# Patient Record
Sex: MALE | Race: WHITE | NOT HISPANIC OR LATINO | URBAN - METROPOLITAN AREA
[De-identification: names, ages, dates, MRNs, and addresses within clinical notes are randomized per-mention and may not be internally consistent; named-entity substitution may affect disease eponyms.]

---

## 2024-02-05 ENCOUNTER — HOSPITAL ENCOUNTER (INPATIENT)
Facility: HOSPITAL | Age: 74
LOS: 4 days | Discharge: HOME HEALTH | DRG: 552 | End: 2024-02-09
Attending: INTERNAL MEDICINE | Admitting: INTERNAL MEDICINE
Payer: MEDICARE

## 2024-02-05 DIAGNOSIS — G95.20 CORD COMPRESSION: ICD-10-CM

## 2024-02-05 DIAGNOSIS — R53.1 WEAKNESS: ICD-10-CM

## 2024-02-05 PROBLEM — R53.81 DEBILITY: Status: ACTIVE | Noted: 2024-02-05

## 2024-02-05 PROBLEM — E11.9 TYPE 2 DIABETES MELLITUS WITHOUT COMPLICATION, WITHOUT LONG-TERM CURRENT USE OF INSULIN: Status: ACTIVE | Noted: 2024-02-05

## 2024-02-05 PROBLEM — N40.0 BPH (BENIGN PROSTATIC HYPERPLASIA): Status: ACTIVE | Noted: 2024-02-05

## 2024-02-05 PROBLEM — M46.20 SPINAL ABSCESS: Status: ACTIVE | Noted: 2024-02-05

## 2024-02-05 PROBLEM — E03.9 HYPOTHYROID: Status: ACTIVE | Noted: 2024-02-05

## 2024-02-05 PROBLEM — G37.3 TRANSVERSE MYELITIS: Status: ACTIVE | Noted: 2024-02-05

## 2024-02-05 LAB
ABO + RH BLD: NORMAL
ALBUMIN SERPL BCP-MCNC: 3.7 G/DL (ref 3.5–5.2)
ALP SERPL-CCNC: 77 U/L (ref 55–135)
ALT SERPL W/O P-5'-P-CCNC: 25 U/L (ref 10–44)
ANION GAP SERPL CALC-SCNC: 8 MMOL/L (ref 8–16)
AST SERPL-CCNC: 25 U/L (ref 10–40)
BACTERIA #/AREA URNS AUTO: ABNORMAL /HPF
BASOPHILS # BLD AUTO: 0.02 K/UL (ref 0–0.2)
BASOPHILS NFR BLD: 0.2 % (ref 0–1.9)
BILIRUB SERPL-MCNC: 1.4 MG/DL (ref 0.1–1)
BILIRUB UR QL STRIP: NEGATIVE
BLD GP AB SCN CELLS X3 SERPL QL: NORMAL
BUN SERPL-MCNC: 13 MG/DL (ref 8–23)
CALCIUM SERPL-MCNC: 9.1 MG/DL (ref 8.7–10.5)
CHLORIDE SERPL-SCNC: 105 MMOL/L (ref 95–110)
CLARITY UR REFRACT.AUTO: CLEAR
CO2 SERPL-SCNC: 18 MMOL/L (ref 23–29)
COLOR UR AUTO: YELLOW
CREAT SERPL-MCNC: 0.8 MG/DL (ref 0.5–1.4)
DIFFERENTIAL METHOD BLD: ABNORMAL
EOSINOPHIL # BLD AUTO: 0 K/UL (ref 0–0.5)
EOSINOPHIL NFR BLD: 0 % (ref 0–8)
ERYTHROCYTE [DISTWIDTH] IN BLOOD BY AUTOMATED COUNT: 14.2 % (ref 11.5–14.5)
EST. GFR  (NO RACE VARIABLE): >60 ML/MIN/1.73 M^2
GLUCOSE SERPL-MCNC: 156 MG/DL (ref 70–110)
GLUCOSE UR QL STRIP: NEGATIVE
HCT VFR BLD AUTO: 34 % (ref 40–54)
HGB BLD-MCNC: 12.2 G/DL (ref 14–18)
HGB UR QL STRIP: ABNORMAL
IMM GRANULOCYTES # BLD AUTO: 0.08 K/UL (ref 0–0.04)
IMM GRANULOCYTES NFR BLD AUTO: 0.6 % (ref 0–0.5)
KETONES UR QL STRIP: ABNORMAL
LEUKOCYTE ESTERASE UR QL STRIP: ABNORMAL
LYMPHOCYTES # BLD AUTO: 0.6 K/UL (ref 1–4.8)
LYMPHOCYTES NFR BLD: 5.1 % (ref 18–48)
MAGNESIUM SERPL-MCNC: 1.8 MG/DL (ref 1.6–2.6)
MCH RBC QN AUTO: 30.7 PG (ref 27–31)
MCHC RBC AUTO-ENTMCNC: 35.9 G/DL (ref 32–36)
MCV RBC AUTO: 86 FL (ref 82–98)
MICROSCOPIC COMMENT: ABNORMAL
MONOCYTES # BLD AUTO: 0.6 K/UL (ref 0.3–1)
MONOCYTES NFR BLD: 5.1 % (ref 4–15)
NEUTROPHILS # BLD AUTO: 11.1 K/UL (ref 1.8–7.7)
NEUTROPHILS NFR BLD: 89 % (ref 38–73)
NITRITE UR QL STRIP: POSITIVE
NRBC BLD-RTO: 0 /100 WBC
PH UR STRIP: 7 [PH] (ref 5–8)
PHOSPHATE SERPL-MCNC: 2.2 MG/DL (ref 2.7–4.5)
PLATELET # BLD AUTO: 80 K/UL (ref 150–450)
PMV BLD AUTO: 9.8 FL (ref 9.2–12.9)
POCT GLUCOSE: 126 MG/DL (ref 70–110)
POTASSIUM SERPL-SCNC: 4.4 MMOL/L (ref 3.5–5.1)
PROT SERPL-MCNC: 7 G/DL (ref 6–8.4)
PROT UR QL STRIP: ABNORMAL
RBC # BLD AUTO: 3.97 M/UL (ref 4.6–6.2)
RBC #/AREA URNS AUTO: 8 /HPF (ref 0–4)
SODIUM SERPL-SCNC: 131 MMOL/L (ref 136–145)
SP GR UR STRIP: 1.01 (ref 1–1.03)
SPECIMEN OUTDATE: NORMAL
SQUAMOUS #/AREA URNS AUTO: 0 /HPF
URN SPEC COLLECT METH UR: ABNORMAL
WBC # BLD AUTO: 12.43 K/UL (ref 3.9–12.7)
WBC #/AREA URNS AUTO: 26 /HPF (ref 0–5)

## 2024-02-05 PROCEDURE — 20000000 HC ICU ROOM

## 2024-02-05 PROCEDURE — 63600175 PHARM REV CODE 636 W HCPCS

## 2024-02-05 PROCEDURE — 84100 ASSAY OF PHOSPHORUS: CPT | Performed by: NURSE PRACTITIONER

## 2024-02-05 PROCEDURE — 36415 COLL VENOUS BLD VENIPUNCTURE: CPT | Performed by: INTERNAL MEDICINE

## 2024-02-05 PROCEDURE — 86850 RBC ANTIBODY SCREEN: CPT | Performed by: NURSE PRACTITIONER

## 2024-02-05 PROCEDURE — 25000003 PHARM REV CODE 250: Performed by: NURSE PRACTITIONER

## 2024-02-05 PROCEDURE — 80053 COMPREHEN METABOLIC PANEL: CPT | Performed by: NURSE PRACTITIONER

## 2024-02-05 PROCEDURE — 83735 ASSAY OF MAGNESIUM: CPT | Performed by: NURSE PRACTITIONER

## 2024-02-05 PROCEDURE — 81001 URINALYSIS AUTO W/SCOPE: CPT | Performed by: INTERNAL MEDICINE

## 2024-02-05 PROCEDURE — 99223 1ST HOSP IP/OBS HIGH 75: CPT | Mod: ,,, | Performed by: NURSE PRACTITIONER

## 2024-02-05 PROCEDURE — 85025 COMPLETE CBC W/AUTO DIFF WBC: CPT | Performed by: NURSE PRACTITIONER

## 2024-02-05 PROCEDURE — 87086 URINE CULTURE/COLONY COUNT: CPT | Performed by: INTERNAL MEDICINE

## 2024-02-05 PROCEDURE — 25500020 PHARM REV CODE 255: Performed by: NURSE PRACTITIONER

## 2024-02-05 RX ORDER — BISACODYL 10 MG/1
10 SUPPOSITORY RECTAL DAILY PRN
Status: DISCONTINUED | OUTPATIENT
Start: 2024-02-05 | End: 2024-02-09 | Stop reason: HOSPADM

## 2024-02-05 RX ORDER — ONDANSETRON HYDROCHLORIDE 2 MG/ML
4 INJECTION, SOLUTION INTRAVENOUS EVERY 8 HOURS PRN
Status: DISCONTINUED | OUTPATIENT
Start: 2024-02-05 | End: 2024-02-09 | Stop reason: HOSPADM

## 2024-02-05 RX ORDER — TAMSULOSIN HYDROCHLORIDE 0.4 MG/1
0.4 CAPSULE ORAL DAILY
Status: DISCONTINUED | OUTPATIENT
Start: 2024-02-06 | End: 2024-02-09 | Stop reason: HOSPADM

## 2024-02-05 RX ORDER — SODIUM,POTASSIUM PHOSPHATES 280-250MG
2 POWDER IN PACKET (EA) ORAL
Status: DISCONTINUED | OUTPATIENT
Start: 2024-02-05 | End: 2024-02-09 | Stop reason: HOSPADM

## 2024-02-05 RX ORDER — SODIUM CHLORIDE 0.9 % (FLUSH) 0.9 %
10 SYRINGE (ML) INJECTION
Status: DISCONTINUED | OUTPATIENT
Start: 2024-02-05 | End: 2024-02-06

## 2024-02-05 RX ORDER — LEVOTHYROXINE SODIUM 25 UG/1
25 TABLET ORAL
Status: DISCONTINUED | OUTPATIENT
Start: 2024-02-06 | End: 2024-02-09 | Stop reason: HOSPADM

## 2024-02-05 RX ORDER — DEXMEDETOMIDINE HYDROCHLORIDE 4 UG/ML
0-1.4 INJECTION, SOLUTION INTRAVENOUS CONTINUOUS
Status: DISCONTINUED | OUTPATIENT
Start: 2024-02-05 | End: 2024-02-06

## 2024-02-05 RX ORDER — BACLOFEN 10 MG/1
20 TABLET ORAL 3 TIMES DAILY
Status: DISCONTINUED | OUTPATIENT
Start: 2024-02-05 | End: 2024-02-09 | Stop reason: HOSPADM

## 2024-02-05 RX ORDER — GABAPENTIN 300 MG/1
600 CAPSULE ORAL 3 TIMES DAILY
Status: DISCONTINUED | OUTPATIENT
Start: 2024-02-05 | End: 2024-02-09 | Stop reason: HOSPADM

## 2024-02-05 RX ORDER — GLUCAGON 1 MG
1 KIT INJECTION
Status: DISCONTINUED | OUTPATIENT
Start: 2024-02-05 | End: 2024-02-09 | Stop reason: HOSPADM

## 2024-02-05 RX ORDER — MIDAZOLAM HYDROCHLORIDE 1 MG/ML
2 INJECTION INTRAMUSCULAR; INTRAVENOUS ONCE AS NEEDED
Status: COMPLETED | OUTPATIENT
Start: 2024-02-05 | End: 2024-02-05

## 2024-02-05 RX ORDER — AMOXICILLIN 250 MG
1 CAPSULE ORAL 2 TIMES DAILY
Status: DISCONTINUED | OUTPATIENT
Start: 2024-02-05 | End: 2024-02-07

## 2024-02-05 RX ORDER — LANOLIN ALCOHOL/MO/W.PET/CERES
800 CREAM (GRAM) TOPICAL
Status: DISCONTINUED | OUTPATIENT
Start: 2024-02-05 | End: 2024-02-09 | Stop reason: HOSPADM

## 2024-02-05 RX ORDER — INSULIN ASPART 100 [IU]/ML
0-10 INJECTION, SOLUTION INTRAVENOUS; SUBCUTANEOUS EVERY 6 HOURS PRN
Status: DISCONTINUED | OUTPATIENT
Start: 2024-02-05 | End: 2024-02-09 | Stop reason: HOSPADM

## 2024-02-05 RX ORDER — MIDAZOLAM HYDROCHLORIDE 1 MG/ML
2 INJECTION INTRAMUSCULAR; INTRAVENOUS ONCE
Status: DISCONTINUED | OUTPATIENT
Start: 2024-02-06 | End: 2024-02-06

## 2024-02-05 RX ORDER — OXYCODONE HYDROCHLORIDE 10 MG/1
10 TABLET ORAL EVERY 6 HOURS PRN
Status: DISCONTINUED | OUTPATIENT
Start: 2024-02-05 | End: 2024-02-09 | Stop reason: HOSPADM

## 2024-02-05 RX ORDER — POLYETHYLENE GLYCOL 3350 17 G/17G
17 POWDER, FOR SOLUTION ORAL DAILY
Status: DISCONTINUED | OUTPATIENT
Start: 2024-02-06 | End: 2024-02-09 | Stop reason: HOSPADM

## 2024-02-05 RX ORDER — GABAPENTIN 300 MG/1
300 CAPSULE ORAL 3 TIMES DAILY
Status: DISCONTINUED | OUTPATIENT
Start: 2024-02-05 | End: 2024-02-05

## 2024-02-05 RX ADMIN — OXYCODONE HYDROCHLORIDE 10 MG: 10 TABLET ORAL at 06:02

## 2024-02-05 RX ADMIN — DEXMEDETOMIDINE HYDROCHLORIDE 0.2 MCG/KG/HR: 4 INJECTION INTRAVENOUS at 10:02

## 2024-02-05 RX ADMIN — HUMAN ALBUMIN MICROSPHERES AND PERFLUTREN 0.11 MG: 10; .22 INJECTION, SOLUTION INTRAVENOUS at 07:02

## 2024-02-05 RX ADMIN — BACLOFEN 20 MG: 10 TABLET ORAL at 08:02

## 2024-02-05 RX ADMIN — GABAPENTIN 600 MG: 300 CAPSULE ORAL at 08:02

## 2024-02-05 RX ADMIN — MIDAZOLAM 2 MG: 1 INJECTION INTRAMUSCULAR; INTRAVENOUS at 11:02

## 2024-02-05 RX ADMIN — SENNOSIDES AND DOCUSATE SODIUM 1 TABLET: 8.6; 5 TABLET ORAL at 09:02

## 2024-02-05 NOTE — CARE UPDATE
FLIGHT CARE TRANSPORT NOTE     Date of Transport: 2024  : 1950  Age: 74 y.o.  Medication Dosing Weight: 104kg  Sex: male  MRN: [id number]  CSN:  Time Of Patient Handoff: 1710    ASSESSMENT/INTERVENTIONS     This patient was transported by [facility] from the ED of [facility] by [facility]. The patient's overall condition remained unchanged throughout transport, with all vital signs remaining stable per the patient's current baseline. All lines, tubes, and devices remained patent and intact. The patient was transferred from the [facility] stretcher to [facility] bed 9081 where care was transitioned to bedside Rena MON[place]NA  without incident. The patient's Personal Belongings and OSH Chart and Diagnostic Disk(s) were left with receiving clinician.         FOLLOW-UP     Call [facility], Nelly Noland RN at [phone] (adult team) or [phone] (pediatric team) for additional questions or concerns.

## 2024-02-05 NOTE — NURSING
Patient arrived to [facility] from [facility] by [facility]    Report received from: OSALFONSO RN,      Type of stroke/diagnosis: Cord Compression    Current symptoms: 9/10 back pain     Skin Assessment done: Y  Wounds noted: None   *If wounds noted, was Wound Care consulted? N  *If wounds noted, LDA placed? N  Skin Assessment Verified by: RUBÉN Cevallos Completed? Pending    Patient Belongings on Admit: Wallet, cell phone     NCC notified: MD Shannon

## 2024-02-06 PROBLEM — R32 URINARY INCONTINENCE: Status: ACTIVE | Noted: 2024-02-06

## 2024-02-06 PROBLEM — M46.20 SPINAL ABSCESS: Status: RESOLVED | Noted: 2024-02-05 | Resolved: 2024-02-06

## 2024-02-06 PROBLEM — E87.1 HYPONATREMIA: Status: ACTIVE | Noted: 2024-02-06

## 2024-02-06 LAB
ALBUMIN SERPL BCP-MCNC: 3.6 G/DL (ref 3.5–5.2)
ALP SERPL-CCNC: 80 U/L (ref 55–135)
ALP SERPL-CCNC: 80 U/L (ref 55–135)
ALP SERPL-CCNC: 83 U/L (ref 55–135)
ALT SERPL W/O P-5'-P-CCNC: 23 U/L (ref 10–44)
ALT SERPL W/O P-5'-P-CCNC: 24 U/L (ref 10–44)
ALT SERPL W/O P-5'-P-CCNC: 24 U/L (ref 10–44)
ANION GAP SERPL CALC-SCNC: 10 MMOL/L (ref 8–16)
ANION GAP SERPL CALC-SCNC: 7 MMOL/L (ref 8–16)
ANION GAP SERPL CALC-SCNC: 7 MMOL/L (ref 8–16)
APTT PPP: 30.5 SEC (ref 21–32)
ASCENDING AORTA: 3.06 CM
AST SERPL-CCNC: 23 U/L (ref 10–40)
AST SERPL-CCNC: 24 U/L (ref 10–40)
AST SERPL-CCNC: 24 U/L (ref 10–40)
AV INDEX (PROSTH): 0.81
AV MEAN GRADIENT: 8 MMHG
AV PEAK GRADIENT: 12 MMHG
AV VALVE AREA BY VELOCITY RATIO: 2.1 CM²
AV VALVE AREA: 2.96 CM²
AV VELOCITY RATIO: 0.57
BASOPHILS # BLD AUTO: 0.01 K/UL (ref 0–0.2)
BASOPHILS # BLD AUTO: 0.03 K/UL (ref 0–0.2)
BASOPHILS NFR BLD: 0.1 % (ref 0–1.9)
BASOPHILS NFR BLD: 0.2 % (ref 0–1.9)
BILIRUB SERPL-MCNC: 1.1 MG/DL (ref 0.1–1)
BILIRUB SERPL-MCNC: 1.2 MG/DL (ref 0.1–1)
BILIRUB SERPL-MCNC: 1.2 MG/DL (ref 0.1–1)
BSA FOR ECHO PROCEDURE: 2.2 M2
BUN SERPL-MCNC: 16 MG/DL (ref 8–23)
BUN SERPL-MCNC: 17 MG/DL (ref 8–23)
BUN SERPL-MCNC: 17 MG/DL (ref 8–23)
CALCIUM SERPL-MCNC: 9 MG/DL (ref 8.7–10.5)
CALCIUM SERPL-MCNC: 9.3 MG/DL (ref 8.7–10.5)
CALCIUM SERPL-MCNC: 9.3 MG/DL (ref 8.7–10.5)
CHLORIDE SERPL-SCNC: 103 MMOL/L (ref 95–110)
CHLORIDE SERPL-SCNC: 104 MMOL/L (ref 95–110)
CHLORIDE SERPL-SCNC: 104 MMOL/L (ref 95–110)
CO2 SERPL-SCNC: 18 MMOL/L (ref 23–29)
CO2 SERPL-SCNC: 20 MMOL/L (ref 23–29)
CO2 SERPL-SCNC: 20 MMOL/L (ref 23–29)
CREAT SERPL-MCNC: 0.8 MG/DL (ref 0.5–1.4)
CV ECHO LV RWT: 0.43 CM
DIFFERENTIAL METHOD BLD: ABNORMAL
DIFFERENTIAL METHOD BLD: ABNORMAL
DOP CALC AO PEAK VEL: 1.74 M/S
DOP CALC AO VTI: 33.67 CM
DOP CALC LVOT AREA: 3.7 CM2
DOP CALC LVOT DIAMETER: 2.16 CM
DOP CALC LVOT PEAK VEL: 1 M/S
DOP CALC LVOT STROKE VOLUME: 99.69 CM3
DOP CALCLVOT PEAK VEL VTI: 27.22 CM
E WAVE DECELERATION TIME: 155.24 MSEC
E/A RATIO: 1.06
E/E' RATIO: 10 M/S
ECHO LV POSTERIOR WALL: 1 CM (ref 0.6–1.1)
EJECTION FRACTION: 55 %
EOSINOPHIL # BLD AUTO: 0 K/UL (ref 0–0.5)
EOSINOPHIL # BLD AUTO: 0 K/UL (ref 0–0.5)
EOSINOPHIL NFR BLD: 0 % (ref 0–8)
EOSINOPHIL NFR BLD: 0 % (ref 0–8)
ERYTHROCYTE [DISTWIDTH] IN BLOOD BY AUTOMATED COUNT: 13.9 % (ref 11.5–14.5)
ERYTHROCYTE [DISTWIDTH] IN BLOOD BY AUTOMATED COUNT: 14.1 % (ref 11.5–14.5)
EST. GFR  (NO RACE VARIABLE): >60 ML/MIN/1.73 M^2
FRACTIONAL SHORTENING: 33 % (ref 28–44)
GLUCOSE SERPL-MCNC: 155 MG/DL (ref 70–110)
GLUCOSE SERPL-MCNC: 155 MG/DL (ref 70–110)
GLUCOSE SERPL-MCNC: 170 MG/DL (ref 70–110)
HCT VFR BLD AUTO: 35.6 % (ref 40–54)
HCT VFR BLD AUTO: 37.4 % (ref 40–54)
HGB BLD-MCNC: 12.6 G/DL (ref 14–18)
HGB BLD-MCNC: 12.9 G/DL (ref 14–18)
IMM GRANULOCYTES # BLD AUTO: 0.06 K/UL (ref 0–0.04)
IMM GRANULOCYTES # BLD AUTO: 0.17 K/UL (ref 0–0.04)
IMM GRANULOCYTES NFR BLD AUTO: 0.5 % (ref 0–0.5)
IMM GRANULOCYTES NFR BLD AUTO: 1.4 % (ref 0–0.5)
INR PPP: 1.3 (ref 0.8–1.2)
INTERVENTRICULAR SEPTUM: 0.8 CM (ref 0.6–1.1)
IVRT: 94.2 MSEC
LA MAJOR: 5.97 CM
LA MINOR: 5.71 CM
LA WIDTH: 3.47 CM
LEFT ATRIUM SIZE: 3.91 CM
LEFT ATRIUM VOLUME INDEX MOD: 23.6 ML/M2
LEFT ATRIUM VOLUME INDEX: 31.8 ML/M2
LEFT ATRIUM VOLUME MOD: 50 CM3
LEFT ATRIUM VOLUME: 67.32 CM3
LEFT INTERNAL DIMENSION IN SYSTOLE: 3.06 CM (ref 2.1–4)
LEFT VENTRICLE DIASTOLIC VOLUME INDEX: 44.88 ML/M2
LEFT VENTRICLE DIASTOLIC VOLUME: 95.14 ML
LEFT VENTRICLE MASS INDEX: 65 G/M2
LEFT VENTRICLE SYSTOLIC VOLUME INDEX: 17.3 ML/M2
LEFT VENTRICLE SYSTOLIC VOLUME: 36.78 ML
LEFT VENTRICULAR INTERNAL DIMENSION IN DIASTOLE: 4.6 CM (ref 3.5–6)
LEFT VENTRICULAR MASS: 137.72 G
LV LATERAL E/E' RATIO: 8.64 M/S
LV SEPTAL E/E' RATIO: 11.88 M/S
LYMPHOCYTES # BLD AUTO: 0.7 K/UL (ref 1–4.8)
LYMPHOCYTES # BLD AUTO: 0.7 K/UL (ref 1–4.8)
LYMPHOCYTES NFR BLD: 5.9 % (ref 18–48)
LYMPHOCYTES NFR BLD: 6.1 % (ref 18–48)
MAGNESIUM SERPL-MCNC: 1.9 MG/DL (ref 1.6–2.6)
MAGNESIUM SERPL-MCNC: 2 MG/DL (ref 1.6–2.6)
MCH RBC QN AUTO: 29.9 PG (ref 27–31)
MCH RBC QN AUTO: 30.1 PG (ref 27–31)
MCHC RBC AUTO-ENTMCNC: 34.5 G/DL (ref 32–36)
MCHC RBC AUTO-ENTMCNC: 35.4 G/DL (ref 32–36)
MCV RBC AUTO: 85 FL (ref 82–98)
MCV RBC AUTO: 87 FL (ref 82–98)
MONOCYTES # BLD AUTO: 0.5 K/UL (ref 0.3–1)
MONOCYTES # BLD AUTO: 0.7 K/UL (ref 0.3–1)
MONOCYTES NFR BLD: 4.4 % (ref 4–15)
MONOCYTES NFR BLD: 5.5 % (ref 4–15)
MV PEAK A VEL: 0.9 M/S
MV PEAK E VEL: 0.95 M/S
MV STENOSIS PRESSURE HALF TIME: 45.02 MS
MV VALVE AREA P 1/2 METHOD: 4.89 CM2
NEUTROPHILS # BLD AUTO: 10.1 K/UL (ref 1.8–7.7)
NEUTROPHILS # BLD AUTO: 11 K/UL (ref 1.8–7.7)
NEUTROPHILS NFR BLD: 87 % (ref 38–73)
NEUTROPHILS NFR BLD: 88.9 % (ref 38–73)
NRBC BLD-RTO: 0 /100 WBC
NRBC BLD-RTO: 0 /100 WBC
PHOSPHATE SERPL-MCNC: 3 MG/DL (ref 2.7–4.5)
PHOSPHATE SERPL-MCNC: 3.3 MG/DL (ref 2.7–4.5)
PLATELET # BLD AUTO: 86 K/UL (ref 150–450)
PLATELET # BLD AUTO: 91 K/UL (ref 150–450)
PLATELET BLD QL SMEAR: ABNORMAL
PMV BLD AUTO: 10 FL (ref 9.2–12.9)
PMV BLD AUTO: 9.6 FL (ref 9.2–12.9)
POCT GLUCOSE: 114 MG/DL (ref 70–110)
POCT GLUCOSE: 124 MG/DL (ref 70–110)
POCT GLUCOSE: 156 MG/DL (ref 70–110)
POCT GLUCOSE: 161 MG/DL (ref 70–110)
POCT GLUCOSE: 164 MG/DL (ref 70–110)
POTASSIUM SERPL-SCNC: 4.5 MMOL/L (ref 3.5–5.1)
PROT SERPL-MCNC: 7.1 G/DL (ref 6–8.4)
PROT SERPL-MCNC: 7.2 G/DL (ref 6–8.4)
PROT SERPL-MCNC: 7.2 G/DL (ref 6–8.4)
PROTHROMBIN TIME: 13.5 SEC (ref 9–12.5)
RA MAJOR: 4.05 CM
RA PRESSURE ESTIMATED: 3 MMHG
RA WIDTH: 3.03 CM
RBC # BLD AUTO: 4.19 M/UL (ref 4.6–6.2)
RBC # BLD AUTO: 4.31 M/UL (ref 4.6–6.2)
RIGHT VENTRICULAR END-DIASTOLIC DIMENSION: 3.06 CM
SINUS: 3.59 CM
SODIUM SERPL-SCNC: 131 MMOL/L (ref 136–145)
STJ: 2.89 CM
TDI LATERAL: 0.11 M/S
TDI SEPTAL: 0.08 M/S
TDI: 0.1 M/S
TRICUSPID ANNULAR PLANE SYSTOLIC EXCURSION: 2.22 CM
WBC # BLD AUTO: 11.4 K/UL (ref 3.9–12.7)
WBC # BLD AUTO: 12.59 K/UL (ref 3.9–12.7)
Z-SCORE OF LEFT VENTRICULAR DIMENSION IN END DIASTOLE: -3.76
Z-SCORE OF LEFT VENTRICULAR DIMENSION IN END SYSTOLE: -2.29

## 2024-02-06 PROCEDURE — 84100 ASSAY OF PHOSPHORUS: CPT | Mod: 91 | Performed by: INTERNAL MEDICINE

## 2024-02-06 PROCEDURE — 99900035 HC TECH TIME PER 15 MIN (STAT)

## 2024-02-06 PROCEDURE — 85025 COMPLETE CBC W/AUTO DIFF WBC: CPT | Mod: 91 | Performed by: INTERNAL MEDICINE

## 2024-02-06 PROCEDURE — 94761 N-INVAS EAR/PLS OXIMETRY MLT: CPT

## 2024-02-06 PROCEDURE — 80053 COMPREHEN METABOLIC PANEL: CPT | Mod: 91 | Performed by: INTERNAL MEDICINE

## 2024-02-06 PROCEDURE — 83735 ASSAY OF MAGNESIUM: CPT | Mod: 91 | Performed by: INTERNAL MEDICINE

## 2024-02-06 PROCEDURE — 51701 INSERT BLADDER CATHETER: CPT

## 2024-02-06 PROCEDURE — 25000003 PHARM REV CODE 250: Performed by: NURSE PRACTITIONER

## 2024-02-06 PROCEDURE — 51798 US URINE CAPACITY MEASURE: CPT

## 2024-02-06 PROCEDURE — 25500020 PHARM REV CODE 255: Performed by: INTERNAL MEDICINE

## 2024-02-06 PROCEDURE — 85610 PROTHROMBIN TIME: CPT | Performed by: INTERNAL MEDICINE

## 2024-02-06 PROCEDURE — 84100 ASSAY OF PHOSPHORUS: CPT | Performed by: NURSE PRACTITIONER

## 2024-02-06 PROCEDURE — 11000001 HC ACUTE MED/SURG PRIVATE ROOM

## 2024-02-06 PROCEDURE — A9585 GADOBUTROL INJECTION: HCPCS | Performed by: INTERNAL MEDICINE

## 2024-02-06 PROCEDURE — 25000003 PHARM REV CODE 250: Performed by: STUDENT IN AN ORGANIZED HEALTH CARE EDUCATION/TRAINING PROGRAM

## 2024-02-06 PROCEDURE — 83735 ASSAY OF MAGNESIUM: CPT | Performed by: NURSE PRACTITIONER

## 2024-02-06 PROCEDURE — 20000000 HC ICU ROOM

## 2024-02-06 PROCEDURE — 99222 1ST HOSP IP/OBS MODERATE 55: CPT | Mod: ,,, | Performed by: STUDENT IN AN ORGANIZED HEALTH CARE EDUCATION/TRAINING PROGRAM

## 2024-02-06 PROCEDURE — 80053 COMPREHEN METABOLIC PANEL: CPT | Performed by: NURSE PRACTITIONER

## 2024-02-06 PROCEDURE — 63600175 PHARM REV CODE 636 W HCPCS: Performed by: NURSE PRACTITIONER

## 2024-02-06 PROCEDURE — 85730 THROMBOPLASTIN TIME PARTIAL: CPT | Performed by: INTERNAL MEDICINE

## 2024-02-06 PROCEDURE — 63600175 PHARM REV CODE 636 W HCPCS: Performed by: STUDENT IN AN ORGANIZED HEALTH CARE EDUCATION/TRAINING PROGRAM

## 2024-02-06 RX ORDER — TAMSULOSIN HYDROCHLORIDE 0.4 MG/1
0.4 CAPSULE ORAL DAILY
COMMUNITY

## 2024-02-06 RX ORDER — ATORVASTATIN CALCIUM 40 MG/1
40 TABLET, FILM COATED ORAL DAILY
COMMUNITY

## 2024-02-06 RX ORDER — INSULIN ASPART 100 [IU]/ML
0-15 INJECTION, SOLUTION INTRAVENOUS; SUBCUTANEOUS
COMMUNITY
Start: 2024-01-11

## 2024-02-06 RX ORDER — SEMAGLUTIDE 0.68 MG/ML
0.5 INJECTION, SOLUTION SUBCUTANEOUS
COMMUNITY
Start: 2023-10-23

## 2024-02-06 RX ORDER — HYDROCODONE BITARTRATE AND ACETAMINOPHEN 7.5; 325 MG/1; MG/1
1 TABLET ORAL EVERY 8 HOURS PRN
COMMUNITY

## 2024-02-06 RX ORDER — BACLOFEN 20 MG/1
20 TABLET ORAL 4 TIMES DAILY
COMMUNITY

## 2024-02-06 RX ORDER — LEVOTHYROXINE SODIUM 25 UG/1
25 TABLET ORAL
COMMUNITY
Start: 2023-09-06

## 2024-02-06 RX ORDER — METFORMIN HYDROCHLORIDE 500 MG/1
500 TABLET ORAL 3 TIMES DAILY
COMMUNITY

## 2024-02-06 RX ORDER — INSULIN DEGLUDEC 100 U/ML
INJECTION, SOLUTION SUBCUTANEOUS
COMMUNITY
Start: 2023-08-22

## 2024-02-06 RX ORDER — ATORVASTATIN CALCIUM 40 MG/1
40 TABLET, FILM COATED ORAL DAILY
Status: DISCONTINUED | OUTPATIENT
Start: 2024-02-06 | End: 2024-02-09 | Stop reason: HOSPADM

## 2024-02-06 RX ORDER — GADOBUTROL 604.72 MG/ML
10 INJECTION INTRAVENOUS
Status: COMPLETED | OUTPATIENT
Start: 2024-02-06 | End: 2024-02-06

## 2024-02-06 RX ORDER — ENALAPRIL MALEATE 10 MG/1
10 TABLET ORAL DAILY
COMMUNITY

## 2024-02-06 RX ORDER — PRAMIPEXOLE DIHYDROCHLORIDE 0.12 MG/1
0.75 TABLET ORAL NIGHTLY
Status: DISCONTINUED | OUTPATIENT
Start: 2024-02-06 | End: 2024-02-09 | Stop reason: HOSPADM

## 2024-02-06 RX ORDER — FAMOTIDINE 20 MG/1
20 TABLET, FILM COATED ORAL 2 TIMES DAILY
COMMUNITY

## 2024-02-06 RX ORDER — PRAMIPEXOLE DIHYDROCHLORIDE 0.75 MG/1
0.75 TABLET ORAL DAILY
COMMUNITY

## 2024-02-06 RX ORDER — MELOXICAM 15 MG/1
15 TABLET ORAL DAILY
COMMUNITY

## 2024-02-06 RX ORDER — TRIAMCINOLONE ACETONIDE 1 MG/G
1 CREAM TOPICAL 3 TIMES DAILY PRN
COMMUNITY

## 2024-02-06 RX ORDER — MIDAZOLAM HYDROCHLORIDE 1 MG/ML
2 INJECTION INTRAMUSCULAR; INTRAVENOUS EVERY 30 MIN PRN
Status: DISCONTINUED | OUTPATIENT
Start: 2024-02-06 | End: 2024-02-07

## 2024-02-06 RX ORDER — GABAPENTIN 600 MG/1
600 TABLET ORAL 3 TIMES DAILY
COMMUNITY

## 2024-02-06 RX ORDER — ENOXAPARIN SODIUM 100 MG/ML
40 INJECTION SUBCUTANEOUS EVERY 24 HOURS
Status: DISCONTINUED | OUTPATIENT
Start: 2024-02-06 | End: 2024-02-09 | Stop reason: HOSPADM

## 2024-02-06 RX ADMIN — TAMSULOSIN HYDROCHLORIDE 0.4 MG: 0.4 CAPSULE ORAL at 10:02

## 2024-02-06 RX ADMIN — SENNOSIDES AND DOCUSATE SODIUM 1 TABLET: 8.6; 5 TABLET ORAL at 09:02

## 2024-02-06 RX ADMIN — INSULIN ASPART 1 UNITS: 100 INJECTION, SOLUTION INTRAVENOUS; SUBCUTANEOUS at 02:02

## 2024-02-06 RX ADMIN — BACLOFEN 20 MG: 10 TABLET ORAL at 09:02

## 2024-02-06 RX ADMIN — BACLOFEN 20 MG: 10 TABLET ORAL at 02:02

## 2024-02-06 RX ADMIN — GABAPENTIN 600 MG: 300 CAPSULE ORAL at 09:02

## 2024-02-06 RX ADMIN — INSULIN ASPART 2 UNITS: 100 INJECTION, SOLUTION INTRAVENOUS; SUBCUTANEOUS at 06:02

## 2024-02-06 RX ADMIN — SENNOSIDES AND DOCUSATE SODIUM 1 TABLET: 8.6; 5 TABLET ORAL at 10:02

## 2024-02-06 RX ADMIN — GABAPENTIN 600 MG: 300 CAPSULE ORAL at 02:02

## 2024-02-06 RX ADMIN — PRAMIPEXOLE DIHYDROCHLORIDE 0.75 MG: 0.12 TABLET ORAL at 09:02

## 2024-02-06 RX ADMIN — GADOBUTROL 10 ML: 604.72 INJECTION INTRAVENOUS at 12:02

## 2024-02-06 RX ADMIN — GABAPENTIN 600 MG: 300 CAPSULE ORAL at 10:02

## 2024-02-06 RX ADMIN — ATORVASTATIN CALCIUM 40 MG: 40 TABLET, FILM COATED ORAL at 10:02

## 2024-02-06 RX ADMIN — BACLOFEN 20 MG: 10 TABLET ORAL at 10:02

## 2024-02-06 RX ADMIN — INSULIN ASPART 2 UNITS: 100 INJECTION, SOLUTION INTRAVENOUS; SUBCUTANEOUS at 12:02

## 2024-02-06 RX ADMIN — ENOXAPARIN SODIUM 40 MG: 40 INJECTION SUBCUTANEOUS at 07:02

## 2024-02-06 RX ADMIN — LEVOTHYROXINE SODIUM 25 MCG: 25 TABLET ORAL at 08:02

## 2024-02-06 NOTE — PLAN OF CARE
"[facility] Care Plan    POC reviewed with Jez Gardner and family at 1400. Pt verbalized understanding. Questions and concerns addressed. No acute events today. Pt progressing toward goals. Will continue to monitor. See below and flowsheets for full assessment and VS info.             Is this a stroke patient? no    Neuro:  Heather Coma Scale  Best Eye Response: 4-->(E4) spontaneous  Best Motor Response: 6-->(M6) obeys commands  Best Verbal Response: 5-->(V5) oriented  Heather Coma Scale Score: 15  Assessment Qualifiers: patient not sedated/intubated  Pupil PERRLA: yes     24 hr Temp:  [98.6 °F (37 °C)]     CV:   Rhythm: normal sinus rhythm  BP goals:   -150  MAP > 65    Resp:           Plan: N/A    GI/:     Diet/Nutrition Received: NPO  Last Bowel Movement: 02/03/24  Voiding Characteristics: incontinence, external catheter  No intake or output data in the 24 hours ending 02/05/24 1926       Labs/Accuchecks:  No results for input(s): "WBC", "RBC", "HGB", "HCT", "PLT" in the last 168 hours. No results for input(s): "NA", "K", "CO2", "CL", "BUN", "CREATININE", "ALKPHOS", "ALT", "AST", "BILITOT" in the last 168 hours.    Invalid input(s): "[id number]" No results for input(s): "PROTIME", "INR", "APTT", "HEPANTIXA" in the last 168 hours. No results for input(s): "CPK", "CPKMB", "TROPONINI", "MB" in the last 168 hours.    Electrolytes: No replacement orders  Accuchecks: Q6H    Gtts:   dexmedeTOMIDine (Precedex) infusion (titrating)         LDA/Wounds:    Nurses Note -- 4 Eyes      2/5/2024   7:26 PM      Skin assessed during: Admit    Is there altered skin present? no   [] No Altered Skin Integrity Present    []Prevention Measures Documented    Attending Nurse:  Loan hCase     Second RN/Staff Member:  LOAN Mendoza   "

## 2024-02-06 NOTE — CONSULTS
"[facility]  Adult Nutrition  Consult Note    SUMMARY     Recommendation/Intervention:   1) Continue current diabetic 2000 kcal diet as tolerated, encourage PO intake  2) If PO intake >50% consistently at meals, consider Boost Glucose Control daily to promote adequate kcal/protein consumption  3) RD to monitor weight, labs, PO intake/tolerance, skin and follow-up as needed    Goals: Meet % of EEN/EPN by next RD follow-up  Nutrition Goal Status: new  Communication of RD Recs: other (comment) (POC)    Assessment and Plan    Nutrition Problem  Inadequate energy intake    Related to (etiology):   Meal tray arrival time    Signs and Symptoms (as evidenced by):   25% intake of lunch tray      Interventions/Recommendations (treatment strategy):  Collaboration with other providers  Carbohydrate controlled diet  Commercial beverage    Nutrition Diagnosis Status:   New    Malnutrition Assessment  No indications for malnutrition at this time. Pt appears nourished- NFPE not warranted.     Reason for Assessment    Reason For Assessment: consult  Diagnosis: other (see comments) (Transverse myelitis)  Relevant Medical History: transverse myelitis with mild chronic Ble weakness, prior history of lumbar laminectomies last 2015, DM2, hypothyroidism  Interdisciplinary Rounds: did not attend  General Information Comments: Pt seen by RD for consult- assess dietary needs. Spoke to pt, pt's wife and RN in room. Pt reports "alright" appetite. Per RN, pt ate 75% of breakfast tray this AM and 25% of lunch tray. His breakfast tray arrived late due to being NPO this AM so pt was not hungry for lunch. No N/V.  Nutrition Discharge Planning: Adequate PO intake    Nutrition Risk Screen    Nutrition Risk Screen: no indicators present    Nutrition/Diet History    Patient Reported Diet/Restrictions/Preferences: other (see comments) (Low sugar per pt's wife)  Food Allergies: NKFA    Anthropometrics    Temp: 98.1 °F (36.7 " "°C)  Height Method: Stated  Height: 5' 6" (167.6 cm)  Height (inches): 66 in  Weight Method: Bed Scale  Weight: 104.3 kg (230 lb)  Weight (lb): 230 lb  Ideal Body Weight (IBW), Male: 142 lb  % Ideal Body Weight, Male (lb): 161.97 %  BMI (Calculated): 37.1  BMI Grade: 35 - 39.9 - obesity - grade II     Lab/Procedures/Meds    Pertinent Labs Reviewed: reviewed  Pertinent Labs Comments: Hgb: 12.9, Hct: 37.4, Na: 131, CO2: 20, Glucose: 155, A1C: 6  Pertinent Medications Reviewed: reviewed   atorvastatin  40 mg Oral Daily    baclofen  20 mg Oral TID    enoxparin  40 mg Subcutaneous Q24H (prophylaxis, 1700)    gabapentin  600 mg Oral TID    levothyroxine  25 mcg Oral Before breakfast    polyethylene glycol  17 g Oral Daily    pramipexole  0.75 mg Oral QHS    senna-docusate 8.6-50 mg  1 tablet Oral BID    tamsulosin  0.4 mg Oral Daily     Estimated/Assessed Needs    Weight Used For Calorie Calculations: 104.3 kg (230 lb)  Energy Calorie Requirements (kcal): 1731 kcal/day  Energy Need Method: Kearney-St Jeor (x 1)  Protein Requirements: 77 g (1.2 g/kg IBW)  Weight Used For Protein Calculations: 64.4 kg (142 lb)  Fluid Requirements (mL): 1 mL/kcal or per MD  Estimated Fluid Requirement Method: RDA Method  RDA Method (mL): 1731  CHO Requirement: 216 g    Nutrition Prescription Ordered    Current Diet Order: Diabetic Diet - 2000 kcal  Nutrition Order Comments: PO intake 25-75%  Oral Nutrition Supplement: None ordered at this time    Evaluation of Received Nutrient/Fluid Intake    I/O: - 326 mL since admit  Energy Calories Required: not meeting needs  Protein Required: not meeting needs  Fluid Required: other (see comments) (As per MD)  Comments: LBM 2/3  Tolerance: tolerating  % Intake of Estimated Energy Needs: Other: 25-75%  % Meal Intake: Other: 25-75%    Nutrition Risk    Level of Risk/Frequency of Follow-up:  (F/u 1x/week)     Monitor and Evaluation    Food and Nutrient Intake: energy intake, food and beverage intake  Food " and Nutrient Adminstration: diet order  Knowledge/Beliefs/Attitudes: food and nutrition knowledge/skill  Physical Activity and Function: nutrition-related ADLs and IADLs  Anthropometric Measurements: weight, weight change, body mass index  Biochemical Data, Medical Tests and Procedures: electrolyte and renal panel, gastrointestinal profile, glucose/endocrine profile, inflammatory profile, lipid profile  Nutrition-Focused Physical Findings: overall appearance, skin     Nutrition Follow-Up    RD Follow-up?: Yes

## 2024-02-06 NOTE — PROGRESS NOTES
"[facility]  Neurocritical Care  Progress Note    Admit Date: 2/5/2024  Service Date: 02/06/2024  Length of Stay: 1    Subjective:     Chief Complaint: Transverse myelitis    History of Present Illness: 74 y.o male presented to OSH with history of fever 103 and not able to ambulate and urinary incontinence. Patient has history of transverse myelitis, DM, BPH, diabetes. Patient states that  when he attempted to get out of bed his leg " gave in". Patient admitted to [facility] for NSGY evaluation and neuro monitoring.     Hospital Course: 02/06/2024 No acute events. MRI with cervical spondylosis, thoracolumbar spondylosis causing significant canal stenosis. Exam with improved BLE strength, antigravity in BLE. Neurosurgery following. VSS, infectious work up unrevealing. Stable for step down to  with Neurosurgery and General Neurology consults.     Interval History: As above.    Review of Systems   Constitutional:  Positive for fever. Negative for chills and fatigue.   Eyes:  Negative for photophobia and visual disturbance.   Respiratory:  Negative for chest tightness and shortness of breath.    Cardiovascular:  Negative for chest pain.   Gastrointestinal:  Negative for diarrhea, nausea and vomiting.   Musculoskeletal:  Positive for gait problem. Negative for neck pain and neck stiffness.   Neurological:  Positive for weakness and numbness. Negative for dizziness, tremors, syncope, light-headedness and headaches.   Psychiatric/Behavioral:  Negative for agitation and confusion.        Objective:     Vitals:  Temp: 98.1 °F (36.7 °C)  Pulse: 68  Rhythm: normal sinus rhythm  BP: 112/76  MAP (mmHg): 91  Resp: (!) 25  SpO2: 97 %    Temp  Min: 96.8 °F (36 °C)  Max: 98.6 °F (37 °C)  Pulse  Min: 55  Max: 90  BP  Min: 112/76  Max: 158/85  MAP (mmHg)  Min: 79  Max: 115  Resp  Min: 8  Max: 30  SpO2  Min: 89 %  Max: 99 %    02/05 0701 - 02/06 0700  In: 74.1 [I.V.:74.1]  Out: 400 [Urine:400]            Physical " Exam  Vitals and nursing note reviewed.   Constitutional:       General: He is not in acute distress.     Appearance: He is not toxic-appearing.   HENT:      Head: Normocephalic and atraumatic.   Eyes:      Extraocular Movements: Extraocular movements intact.      Pupils: Pupils are equal, round, and reactive to light.   Cardiovascular:      Rate and Rhythm: Normal rate and regular rhythm.      Pulses: Normal pulses.   Pulmonary:      Effort: Pulmonary effort is normal. No respiratory distress.   Musculoskeletal:         General: Normal range of motion.      Cervical back: Normal range of motion. No rigidity.   Skin:     General: Skin is warm and dry.   Neurological:      Mental Status: He is alert.      Comments:   E4V5M6  Alert and oriented to person, place and time  PERRL, EOMi  CNII-XII grossly intact  Strength 5/5 in BUE  Strength 4-/5 BLE  SILT       Unable to test gait due to level of consciousness.       Medications:  Continuous Scheduledatorvastatin, 40 mg, Daily  baclofen, 20 mg, TID  enoxparin, 40 mg, Q24H (prophylaxis, 1700)  gabapentin, 600 mg, TID  levothyroxine, 25 mcg, Before breakfast  polyethylene glycol, 17 g, Daily  pramipexole, 0.75 mg, QHS  senna-docusate 8.6-50 mg, 1 tablet, BID  tamsulosin, 0.4 mg, Daily    PRNbisacodyL, 10 mg, Daily PRN  dextrose 10%, 12.5 g, PRN  dextrose 10%, 25 g, PRN  glucagon (human recombinant), 1 mg, PRN  insulin aspart U-100, 0-10 Units, Q6H PRN  magnesium oxide, 800 mg, PRN  magnesium oxide, 800 mg, PRN  ondansetron, 4 mg, Q8H PRN  oxyCODONE, 10 mg, Q6H PRN  potassium bicarbonate, 35 mEq, PRN  potassium bicarbonate, 50 mEq, PRN  potassium bicarbonate, 60 mEq, PRN  potassium, sodium phosphates, 2 packet, PRN  potassium, sodium phosphates, 2 packet, PRN  potassium, sodium phosphates, 2 packet, PRN      Today I personally reviewed pertinent medications, lines/drains/airways, imaging, cardiology results, laboratory results, microbiology results, notably:    Estimated  Creatinine Clearance: 93.1 mL/min (based on SCr of 0.8 mg/dL).    Diet  Diet diabetic 2000 Calorie  Diet diabetic 2000 Calorie      Assessment/Plan:     Neuro  * Transverse myelitis  Hx of  See Cord compression    Cord compression  74M with prior transverse myelitis admitted with increased BLE weakness and incontinence associated with acute febrile illness. MRI with multifocal cord compression. Neurosurgery following.     - Stable for step down to Hospital Medicine  - Neurosurgery following  - Consider General Neurology consult on step down  - Q4h neurochecks while in ICU  - Q4h vitals while in ICU  - Continue home Baclofen, Gabapentin, Pramipexole  - CT MRI reviewed   - Daily CBC, transfuse PRN  - Lovenox  - PT/OT/SLP as appropriate  - CM/SW consult for dispo planning    Renal/  Urinary incontinence  See Cord compression    BPH (benign prostatic hyperplasia)  Continue home flomax    Endocrine  Hypothyroid  Continue levothyroxine    Type 2 diabetes mellitus without complication, without long-term current use of insulin  A1c 6.0  SSI    Other  Debility  PT/OT          The patient is being Prophylaxed for:  Venous Thromboembolism with: Chemical  Stress Ulcer with: Not Applicable   Ventilator Pneumonia with: not applicable    Activity Orders            Diet diabetic 2000 Calorie: Diabetic starting at 02/06 0837    Turn patient starting at 02/05 1800    Bed rest starting at 02/05 1726    Elevate HOB starting at 02/05 1722          Full Code    Ilya Ortega MD  Neurocritical Care  [facility]

## 2024-02-06 NOTE — CONSULTS
Inpatient consult to Physical Medicine Rehab  Consult performed by: Jennifer Otoole NP  Consult ordered by: Mini Peck NP  Reason for consult: Assess rehab needs      Reviewed patient history and current admission.  Rehab team following.  Full consult to follow.    TREMAYNE Jett, FNP-C  Physical Medicine & Rehabilitation   02/06/2024

## 2024-02-06 NOTE — CONSULTS
[facility]  Neurosurgery  Consult Note    Inpatient consult to Neurosurgery  Consult performed by: Amos Wilson MD  Consult ordered by: Mini Peck NP        Subjective:     Chief Complaint/Reason for Admission: leg weakness    History of Present Illness: Pt is 74M hx of transverse myelitis with mild chronic Ble weakness, prior history of lumbar laminectomies last 2015 who preents with fever and new onset weakness BLE. Pt states a few days ago had fever of 103, and two days ago knees gave out and he fell to ground and has felt weak ever since. Pt states it has been dificult to ambulate since and tranferred to [facility] for further workup.    No medications prior to admission.       Review of patient's allergies indicates:   Allergen Reactions    Penicillins        No past medical history on file.  No past surgical history on file.  Family History    None       Tobacco Use    Smoking status: Not on file    Smokeless tobacco: Not on file   Substance and Sexual Activity    Alcohol use: Not on file    Drug use: Not on file    Sexual activity: Not on file     Review of Systems  Objective:     Weight: 104.3 kg (230 lb)  Body mass index is 37.12 kg/m².  Vital Signs (Most Recent):  Temp: 96.8 °F (36 °C) (02/06/24 0301)  Pulse: 60 (02/06/24 0401)  Resp: (!) 30 (02/06/24 0401)  BP: (!) 158/85 (02/06/24 0401)  SpO2: (!) 93 % (02/06/24 0401) Vital Signs (24h Range):  Temp:  [96.8 °F (36 °C)-98.6 °F (37 °C)] 96.8 °F (36 °C)  Pulse:  [60-90] 60  Resp:  [16-30] 30  SpO2:  [89 %-99 %] 93 %  BP: (113-158)/(60-92) 158/85                         Male External Urinary Catheter 02/06/24 0126 Medium (Active)   Output (mL) 350 mL 02/06/24 0101          Physical Exam         Neurosurgery Physical Exam  General: well developed, well nourished, no distress.   Head: normocephalic, atraumatic  CV: RRR, pulses equal bilateral  Pulmonary: normal respirations, no signs of respiratory distress  Abdomen: soft,  non-distended  Skin: Skin is warm, dry and intact.    Neuro:  E4V5M6  Awake, alert, Ox4  PERRL, EOMI  CNII-XII grossly intact  Motor: Follows commands full strength x4, R foot DF somehwat contracted   SILT  Negative bowser  No hyperreflexia      Significant Labs:  Recent Labs   Lab 02/05/24 1946 02/06/24  0244 02/06/24  0410   * 170* 155*  155*   * 131* 131*  131*   K 4.4 4.5 4.5  4.5    103 104  104   CO2 18* 18* 20*  20*   BUN 13 16 17  17   CREATININE 0.8 0.8 0.8  0.8   CALCIUM 9.1 9.0 9.3  9.3   MG 1.8 1.9 2.0     Recent Labs   Lab 02/05/24 1946 02/06/24  0348 02/06/24  0410   WBC 12.43 12.59 11.40   HGB 12.2* 12.6* 12.9*   HCT 34.0* 35.6* 37.4*   PLT 80*  --  91*     Recent Labs   Lab 02/06/24  0410   INR 1.3*   APTT 30.5     Microbiology Results (last 7 days)       Procedure Component Value Units Date/Time    Urine culture [[id number]] Collected: 02/05/24 1932    Order Status: No result Specimen: Urine Updated: 02/05/24 2111          All pertinent labs from the last 24 hours have been reviewed.    Significant Diagnostics:  I have reviewed all pertinent imaging results/findings within the past 24 hours.  I have reviewed and interpreted all pertinent imaging results/findings within the past 24 hours.  Assessment/Plan:     Cord compression  Pt is 74M hx of transverse myelitis with mild chronic Ble weakness, prior history of lumbar laminectomies last 2015 who preents with fever and new onset weakness BLE. Pt states a few days ago had fever of 103, and two days ago knees gave out and he fell to ground and has felt weak ever since. Pt states it has been dificult to ambulate since and tranferred to [facility] for further workup.    Plan:  --Admitted to [facility]  --No neurosurgical intervention at this time  --MRI CTL shows chronic multilevel cervical stenosis, prior L3-5 laminectomies with chronic degenrative changes, moderate stenosis but does not correlate with constellation of acute symptoms, as  well as some subtle cauda equina nerve root enhancement.   -- FU NCC workup for possible transverse myelitis recurrence  -- FU PTOT as patient was mch stronger this morning than reported  -- FU labs, blood cultures, diagnostics per NCC  Will continue to monitor        Thank you for your consult. I will follow-up with patient. Please contact us if you have any additional questions.    Amos Wilson MD  Neurosurgery  [facility]

## 2024-02-06 NOTE — PROGRESS NOTES
"[facility] Critical Care  [facility] Medicine  IMN Transfer Acceptance Note    Patient Name: Jez Gardner  MRN: [id number]  Patient Class: IP- Inpatient   Admission Date: 2/5/2024  Length of Stay: 1 days  Attending Physician: Steve Forbes MD  Primary Care Provider: Deena, Primary Doctor        Subjective:     Principal Problem:Transverse myelitis        HPI:  74 y.o male PMH transverse myelitis, DM, BPH, presented to OSH with history of fever 103 and not able to ambulate and urinary incontinence.  Patient states that  when he attempted to get out of bed his leg "gave in". Patient admitted to [facility] for NSGY evaluation and neuro monitoring.      Hospital Course: 02/06/2024 No acute events.   MRI with cervical spondylosis, thoracolumbar spondylosis causing significant canal stenosis. Exam with improved BLE strength, antigravity in BLE. Neurosurgery following. VSS, infectious work up unrevealing. Stable for step down to  with Neurosurgery and General Neurology consults.        Overview/Hospital Course:  No notes on file    Interval History: see above    Review of Systems  Objective:     Vital Signs (Most Recent):  Temp: 97.7 °F (36.5 °C) (02/06/24 1605)  Pulse: 72 (02/06/24 1705)  Resp: (!) 24 (02/06/24 1705)  BP: (!) 122/58 (02/06/24 1705)  SpO2: 96 % (02/06/24 1705) Vital Signs (24h Range):  Temp:  [96.8 °F (36 °C)-98.6 °F (37 °C)] 97.7 °F (36.5 °C)  Pulse:  [55-90] 72  Resp:  [8-30] 24  SpO2:  [89 %-97 %] 96 %  BP: (112-158)/(58-92) 122/58     Weight: 104.3 kg (230 lb)  Body mass index is 37.12 kg/m².    Intake/Output Summary (Last 24 hours) at 2/6/2024 1719  Last data filed at 2/6/2024 1330  Gross per 24 hour   Intake 774.11 ml   Output 1675 ml   Net -900.89 ml         Physical Exam        Significant Labs: All pertinent labs within the past 24 hours have been reviewed.  CBC:   Recent Labs   Lab 02/05/24  1946 02/06/24  0348 02/06/24  0410   WBC 12.43 12.59 11.40   HGB 12.2* 12.6* 12.9*   HCT 34.0* 35.6* 37.4*   PLT " "80* 86* 91*     CMP:   Recent Labs   Lab 02/05/24  1946 02/06/24  0244 02/06/24  0410   * 131* 131*  131*   K 4.4 4.5 4.5  4.5    103 104  104   CO2 18* 18* 20*  20*   * 170* 155*  155*   BUN 13 16 17  17   CREATININE 0.8 0.8 0.8  0.8   CALCIUM 9.1 9.0 9.3  9.3   PROT 7.0 7.1 7.2  7.2   ALBUMIN 3.7 3.6 3.6  3.6   BILITOT 1.4* 1.1* 1.2*  1.2*   ALKPHOS 77 83 80  80   AST 25 23 24  24   ALT 25 23 24  24   ANIONGAP 8 10 7*  7*       Significant Imaging: I have reviewed all pertinent imaging results/findings within the past 24 hours.    Assessment/Plan:      * Transverse myelitis  See " Cord Compression"  S/p NCC   Weakness improving    Cord compression   74M with prior transverse myelitis admitted with increased BLE weakness and incontinence associated with acute febrile illness. MRI with multifocal cord compression. Neurosurgery following.     Due to spinal stenosis.     - s/p NCC, plan for step down to Hospital Medicine on 2/6  - Neurosurgery following  - Consider General Neurology consult on step down  - Q4h neurochecks while in ICU  - Q4h vitals while in ICU  - Continue home Baclofen, Gabapentin, Pramipexole  - CT MRI reviewed   - Daily CBC, transfuse PRN  - Lovenox  - PT/OT/SLP as appropriate  - CM/SW consult for dispo planning    Type 2 diabetes mellitus without complication, without long-term current use of insulin  Patient's FSGs are controlled on current medication regimen.  Last A1c reviewed-  6  Lab Results   Component Value Date    HGBA1C 6.0 (H) 12/18/2023     Most recent fingerstick glucose reviewed-   Recent Labs   Lab 02/06/24  0211 02/06/24  0612 02/06/24  0806 02/06/24  1214   POCTGLUCOSE 164* 114* 124* 161*     Current correctional scale  Medium  Maintain anti-hyperglycemic dose as follows-   Antihyperglycemics (From admission, onward)      Start     Stop Route Frequency Ordered    02/05/24 1937  insulin aspart U-100 pen 0-10 Units         -- SubQ Every 6 hours PRN " 02/05/24 1842          Hold Oral hypoglycemics while patient is in the hospital.      Hyponatremia  Patient has hyponatremia which is controlled,We will aim to correct the sodium by 4-6mEq in 24 hours. We will monitor sodium Daily. The hyponatremia is due to Dehydration/hypovolemia. We will obtain the following studies: see [facility] lab. We will treat the hyponatremia with IVFs. Patient's sodium results have been reviewed and are listed below.  Recent Labs   Lab 02/06/24  0410   *  131*       BPH (benign prostatic hyperplasia)   Continue home flomax    Urinary incontinence  Due to spinal stenosis, BPH      Debility  Patient with Acute on chronic debility due to other reduced mobility. Latest AMPAC and GEMS scores have been reviewed. Evaluation for etiology is complete. Has spinal stenosis. High FALL risk. Plan includes PT/OT consulted.    Hypothyroid  Continue levothyroxine      VTE Risk Mitigation (From admission, onward)           Ordered     enoxaparin injection 40 mg  Every 24 hours         02/06/24 1017     IP VTE LOW RISK PATIENT  Once         02/05/24 1726     Place sequential compression device  Until discontinued         02/05/24 1726                    Discharge Planning   DENIS:      Code Status: Full Code   Is the patient medically ready for discharge?:     Reason for patient still in hospital (select all that apply): Patient trending condition           Stormy Askew MD  Department of Hospital Medicine   [facility] - Neuro Critical Care

## 2024-02-06 NOTE — ASSESSMENT & PLAN NOTE
74M with prior transverse myelitis admitted with increased BLE weakness and incontinence associated with acute febrile illness. MRI with multifocal cord compression. Neurosurgery following.     Due to spinal stenosis.     - s/p NCC, plan for step down to Hospital Medicine on 2/6  - Neurosurgery following  - Consider General Neurology consult on step down  - Q4h neurochecks while in ICU  - Q4h vitals while in ICU  - Continue home Baclofen, Gabapentin, Pramipexole  - CT MRI reviewed   - Daily CBC, transfuse PRN  - Lovenox  - PT/OT/SLP as appropriate  - CM/SW consult for dispo planning

## 2024-02-06 NOTE — HPI
"74 y.o male PMH transverse myelitis, DM, BPH, presented to OSH with history of fever 103 and not able to ambulate and urinary incontinence.  Patient states that  when he attempted to get out of bed his leg "gave in". Patient admitted to [facility] for NSGY evaluation and neuro monitoring.      Hospital Course: 02/06/2024 No acute events.   MRI with cervical spondylosis, thoracolumbar spondylosis causing significant canal stenosis. Exam with improved BLE strength, antigravity in BLE. Neurosurgery following. VSS, infectious work up unrevealing. Stable for step down to  with Neurosurgery and General Neurology consults.   02/07/2024 No acute events. MR of Cspine pending. May need surgery pending MRI findings.   Patient strength improving with PT and ambulating. Tolerating PO. Per NSGY MRI 2/8 with stenosis at C3-6, No acute neurosurgical intervention, recommend follow up in clinic.   "

## 2024-02-06 NOTE — HPI
Pt is 74M hx of transverse myelitis with mild chronic Ble weakness, prior history of lumbar laminectomies last 2015 who preents with fever and new onset weakness BLE. Pt states a few days ago had fever of 103, and two days ago knees gave out and he fell to ground and has felt weak ever since. Pt states it has been dificult to ambulate since and tranferred to [facility] for further workup.

## 2024-02-06 NOTE — ASSESSMENT & PLAN NOTE
Patient has hyponatremia which is controlled,We will aim to correct the sodium by 4-6mEq in 24 hours. We will monitor sodium Daily. The hyponatremia is due to Dehydration/hypovolemia. We will obtain the following studies: see NCC lab. We will treat the hyponatremia with IVFs. Patient's sodium results have been reviewed and are listed below.  Recent Labs   Lab 02/06/24  0410   *  131*

## 2024-02-06 NOTE — H&P
"[facility]  Neurocritical Care  History & Physical    Admit Date: 2/5/2024  Service Date: 02/05/2024  Length of Stay: 0    Subjective:     Chief Complaint: Transverse myelitis    History of Present Illness: 74 y.o male presented to OSH with history of fever 103 and not able to ambulate and urinary incontinence. Patient has history of transverse myelitis, DM, BPH, diabetes. Patient states that  when he attempted to get out of bed his leg " gave in". Patient admitted to [facility] for NSGY evaluation and neuro monitoring.       No past medical history on file.  No past surgical history on file.   No current facility-administered medications on file prior to encounter.     No current outpatient medications on file prior to encounter.      Allergies: Patient has no allergy information on record.  No family history on file.     Review of Systems   Constitutional: Negative.    HENT: Negative.     Eyes: Negative.    Respiratory: Negative.     Cardiovascular: Negative.    Gastrointestinal: Negative.    Endocrine: Negative.    Genitourinary:  Positive for difficulty urinating.   Musculoskeletal: Negative.    Skin: Negative.    Neurological:  Positive for weakness and numbness.   Psychiatric/Behavioral: Negative.       Objective:     Vitals:    Temp: 98.6 °F (37 °C)  Pulse: 89  BP: 124/68  MAP (mmHg): 88  Resp: (!) 22  SpO2: 95 %    Temp  Min: 98.6 °F (37 °C)  Max: 98.6 °F (37 °C)  Pulse  Min: 89  Max: 89  BP  Min: 113/67  Max: 126/80  MAP (mmHg)  Min: 88  Max: 99  Resp  Min: 22  Max: 25  SpO2  Min: 95 %  Max: 99 %    No intake/output data recorded.            Physical Exam  Vitals and nursing note reviewed.   Constitutional:       Appearance: Normal appearance.   HENT:      Head: Normocephalic.      Nose: Nose normal.      Mouth/Throat:      Mouth: Mucous membranes are moist.      Pharynx: Oropharynx is clear.   Eyes:      Pupils: Pupils are equal, round, and reactive to light.   Cardiovascular:      Rate and " Rhythm: Normal rate and regular rhythm.      Pulses: Normal pulses.      Heart sounds: Normal heart sounds.   Pulmonary:      Effort: Pulmonary effort is normal.      Breath sounds: Normal breath sounds.   Abdominal:      Palpations: Abdomen is soft.   Musculoskeletal:         General: Normal range of motion.   Skin:     General: Skin is warm and dry.      Capillary Refill: Capillary refill takes 2 to 3 seconds.   Neurological:      Mental Status: He is alert.      Comments: GCS 15  PERRL  RUE 4/5  LUE 4/5  RLE 3/5  LLE 3/5  Sensation intact R>L                Today I personally reviewed pertinent medications, lines/drains/airways, imaging, cardiology results, laboratory results, microbiology results, notably:        Assessment/Plan:     Neuro  * Transverse myelitis  - Hx of    Cord compression  - Admit to NCC  - NSGY consulted  - MRI pending  - PT/OT  -     Renal/  BPH (benign prostatic hyperplasia)  - resume flomax    ID  Spinal abscess  - MRI pending  - NSGY consulted    Endocrine  Hypothyroid  - resume levothyroxine    Type 2 diabetes mellitus without complication, without long-term current use of insulin  - SSI    Other  Debility  PT/OT          The patient is being Prophylaxed for:  Venous Thromboembolism with: Mechanical or Chemical  Stress Ulcer with: Not Applicable   Ventilator Pneumonia with: not applicable    Activity Orders            Turn patient starting at 02/05 1800    Bed rest starting at 02/05 1726    Elevate HOB starting at 02/05 1722    Diet NPO: NPO starting at 02/05 1722          Full Code  Level 3  Mini Peck NP  Neurocritical Care  [facility]

## 2024-02-06 NOTE — SUBJECTIVE & OBJECTIVE
Interval History: As above.    Review of Systems   Constitutional:  Positive for fever. Negative for chills and fatigue.   Eyes:  Negative for photophobia and visual disturbance.   Respiratory:  Negative for chest tightness and shortness of breath.    Cardiovascular:  Negative for chest pain.   Gastrointestinal:  Negative for diarrhea, nausea and vomiting.   Musculoskeletal:  Positive for gait problem. Negative for neck pain and neck stiffness.   Neurological:  Positive for weakness and numbness. Negative for dizziness, tremors, syncope, light-headedness and headaches.   Psychiatric/Behavioral:  Negative for agitation and confusion.        Objective:     Vitals:  Temp: 98.1 °F (36.7 °C)  Pulse: 68  Rhythm: normal sinus rhythm  BP: 112/76  MAP (mmHg): 91  Resp: (!) 25  SpO2: 97 %    Temp  Min: 96.8 °F (36 °C)  Max: 98.6 °F (37 °C)  Pulse  Min: 55  Max: 90  BP  Min: 112/76  Max: 158/85  MAP (mmHg)  Min: 79  Max: 115  Resp  Min: 8  Max: 30  SpO2  Min: 89 %  Max: 99 %    02/05 0701 - 02/06 0700  In: 74.1 [I.V.:74.1]  Out: 400 [Urine:400]            Physical Exam  Vitals and nursing note reviewed.   Constitutional:       General: He is not in acute distress.     Appearance: He is not toxic-appearing.   HENT:      Head: Normocephalic and atraumatic.   Eyes:      Extraocular Movements: Extraocular movements intact.      Pupils: Pupils are equal, round, and reactive to light.   Cardiovascular:      Rate and Rhythm: Normal rate and regular rhythm.      Pulses: Normal pulses.   Pulmonary:      Effort: Pulmonary effort is normal. No respiratory distress.   Musculoskeletal:         General: Normal range of motion.      Cervical back: Normal range of motion. No rigidity.   Skin:     General: Skin is warm and dry.   Neurological:      Mental Status: He is alert.      Comments:   E4V5M6  Alert and oriented to person, place and time  PERRL, EOMi  CNII-XII grossly intact  Strength 5/5 in BUE  Strength 4-/5 BLE  SILT       Unable to  test gait due to level of consciousness.       Medications:  Continuous Scheduledatorvastatin, 40 mg, Daily  baclofen, 20 mg, TID  enoxparin, 40 mg, Q24H (prophylaxis, 1700)  gabapentin, 600 mg, TID  levothyroxine, 25 mcg, Before breakfast  polyethylene glycol, 17 g, Daily  pramipexole, 0.75 mg, QHS  senna-docusate 8.6-50 mg, 1 tablet, BID  tamsulosin, 0.4 mg, Daily    PRNbisacodyL, 10 mg, Daily PRN  dextrose 10%, 12.5 g, PRN  dextrose 10%, 25 g, PRN  glucagon (human recombinant), 1 mg, PRN  insulin aspart U-100, 0-10 Units, Q6H PRN  magnesium oxide, 800 mg, PRN  magnesium oxide, 800 mg, PRN  ondansetron, 4 mg, Q8H PRN  oxyCODONE, 10 mg, Q6H PRN  potassium bicarbonate, 35 mEq, PRN  potassium bicarbonate, 50 mEq, PRN  potassium bicarbonate, 60 mEq, PRN  potassium, sodium phosphates, 2 packet, PRN  potassium, sodium phosphates, 2 packet, PRN  potassium, sodium phosphates, 2 packet, PRN      Today I personally reviewed pertinent medications, lines/drains/airways, imaging, cardiology results, laboratory results, microbiology results, notably:    Estimated Creatinine Clearance: 93.1 mL/min (based on SCr of 0.8 mg/dL).    Diet  Diet diabetic 2000 Calorie  Diet diabetic 2000 Calorie

## 2024-02-06 NOTE — PLAN OF CARE
"[facility] Care Plan    POC reviewed with Jez Gardner and family at 0300. Pt verbalized understanding. Questions and concerns addressed. No acute events overnight. Pt progressing toward goals. Will continue to monitor. See below and flowsheets for full assessment and VS info.     -MRI completed  -3L NC started        Is this a stroke patient? no    Neuro:  [place]ather Coma Scale  Best Eye Response: 4-->(E4) spontaneous  Best Motor Response: 6-->(M6) obeys commands  Best Verbal Response: 5-->(V5) oriented  [place] Coma Scale Score: 15  Assessment Qualifiers: patient not sedated/intubated  Pupil PERRLA: yes     24hr Temp:  [96.8 °F (36 °C)-98.6 °F (37 °C)]     CV:   Rhythm: normal sinus rhythm  BP goals:   SBP <  130-150  MAP > 65    Resp:           Plan: N/A    GI/:     Diet/Nutrition Received: NPO  Last Bowel Movement: 02/03/24  Voiding Characteristics: external catheter    Intake/Output Summary (Last 24 hours) at 2/6/2024 0526  Last data filed at 2/6/2024 0318  Gross per 24 hour   Intake 74.11 ml   Output 350 ml   Net -275.89 ml          Labs/Accuchecks:  Recent Labs   Lab 02/06/24  0410   WBC 11.40   RBC 4.31*   HGB 12.9*   HCT 37.4*   PLT 91*      Recent Labs   Lab 02/06/24  0410   *  131*   K 4.5  4.5   CO2 20*  20*     104   BUN 17  17   CREATININE 0.8  0.8   ALKPHOS 80  80   ALT 24  24   AST 24  24   BILITOT 1.2*  1.2*      Recent Labs   Lab 02/06/24  0410   INR 1.3*   APTT 30.5    No results for input(s): "CPK", "CPKMB", "TROPONINI", "MB" in the last 168 hours.    Electrolytes: N/A - electrolytes WDL  Accuchecks: Q6H    Gtts:   dexmedeTOMIDine (Precedex) infusion (titrating) Stopped (02/06/24 0110)       LDA/Wounds:    Nurses Note -- 4 Eyes      2/6/2024   5:26 AM      Skin assessed during: Q Shift Change    Is there altered skin present? no   [x] No Altered Skin Integrity Present    []Prevention Measures Documented    "

## 2024-02-06 NOTE — ASSESSMENT & PLAN NOTE
Patient's FSGs are controlled on current medication regimen.  Last A1c reviewed-  6  Lab Results   Component Value Date    HGBA1C 6.0 (H) 12/18/2023     Most recent fingerstick glucose reviewed-   Recent Labs   Lab 02/06/24  0211 02/06/24  0612 02/06/24  0806 02/06/24  1214   POCTGLUCOSE 164* 114* 124* 161*     Current correctional scale  Medium  Maintain anti-hyperglycemic dose as follows-   Antihyperglycemics (From admission, onward)      Start     Stop Route Frequency Ordered    02/05/24 1937  insulin aspart U-100 pen 0-10 Units         -- SubQ Every 6 hours PRN 02/05/24 1842          Hold Oral hypoglycemics while patient is in the hospital.

## 2024-02-06 NOTE — SUBJECTIVE & OBJECTIVE
No medications prior to admission.       Review of patient's allergies indicates:   Allergen Reactions    Penicillins        No past medical history on file.  No past surgical history on file.  Family History    None       Tobacco Use    Smoking status: Not on file    Smokeless tobacco: Not on file   Substance and Sexual Activity    Alcohol use: Not on file    Drug use: Not on file    Sexual activity: Not on file     Review of Systems  Objective:     Weight: 104.3 kg (230 lb)  Body mass index is 37.12 kg/m².  Vital Signs (Most Recent):  Temp: 96.8 °F (36 °C) (02/06/24 0301)  Pulse: 60 (02/06/24 0401)  Resp: (!) 30 (02/06/24 0401)  BP: (!) 158/85 (02/06/24 0401)  SpO2: (!) 93 % (02/06/24 0401) Vital Signs (24h Range):  Temp:  [96.8 °F (36 °C)-98.6 °F (37 °C)] 96.8 °F (36 °C)  Pulse:  [60-90] 60  Resp:  [16-30] 30  SpO2:  [89 %-99 %] 93 %  BP: (113-158)/(60-92) 158/85                         Male External Urinary Catheter 02/06/24 0126 Medium (Active)   Output (mL) 350 mL 02/06/24 0101          Physical Exam         Neurosurgery Physical Exam  General: well developed, well nourished, no distress.   Head: normocephalic, atraumatic  CV: RRR, pulses equal bilateral  Pulmonary: normal respirations, no signs of respiratory distress  Abdomen: soft, non-distended  Skin: Skin is warm, dry and intact.    Neuro:  E4V5M6  Awake, alert, Ox4  PERRL, EOMI  CNII-XII grossly intact  Motor: Follows commands full strength x4, R foot DF somehwat contracted   SILT  Negative bowser  No hyperreflexia      Significant Labs:  Recent Labs   Lab 02/05/24  1946 02/06/24  0244 02/06/24  0410   * 170* 155*  155*   * 131* 131*  131*   K 4.4 4.5 4.5  4.5    103 104  104   CO2 18* 18* 20*  20*   BUN 13 16 17  17   CREATININE 0.8 0.8 0.8  0.8   CALCIUM 9.1 9.0 9.3  9.3   MG 1.8 1.9 2.0     Recent Labs   Lab 02/05/24  1946 02/06/24  0348 02/06/24  0410   WBC 12.43 12.59 11.40   HGB 12.2* 12.6* 12.9*   HCT 34.0* 35.6* 37.4*    PLT 80*  --  91*     Recent Labs   Lab 02/06/24  0410   INR 1.3*   APTT 30.5     Microbiology Results (last 7 days)       Procedure Component Value Units Date/Time    Urine culture [[id number]] Collected: 02/05/24 1932    Order Status: No result Specimen: Urine Updated: 02/05/24 2111          All pertinent labs from the last 24 hours have been reviewed.    Significant Diagnostics:  I have reviewed all pertinent imaging results/findings within the past 24 hours.  I have reviewed and interpreted all pertinent imaging results/findings within the past 24 hours.

## 2024-02-06 NOTE — SUBJECTIVE & OBJECTIVE
Interval History: see above    Review of Systems  Objective:     Vital Signs (Most Recent):  Temp: 97.7 °F (36.5 °C) (02/06/24 1605)  Pulse: 72 (02/06/24 1705)  Resp: (!) 24 (02/06/24 1705)  BP: (!) 122/58 (02/06/24 1705)  SpO2: 96 % (02/06/24 1705) Vital Signs (24h Range):  Temp:  [96.8 °F (36 °C)-98.6 °F (37 °C)] 97.7 °F (36.5 °C)  Pulse:  [55-90] 72  Resp:  [8-30] 24  SpO2:  [89 %-97 %] 96 %  BP: (112-158)/(58-92) 122/58     Weight: 104.3 kg (230 lb)  Body mass index is 37.12 kg/m².    Intake/Output Summary (Last 24 hours) at 2/6/2024 1719  Last data filed at 2/6/2024 1330  Gross per 24 hour   Intake 774.11 ml   Output 1675 ml   Net -900.89 ml         Physical Exam        Significant Labs: All pertinent labs within the past 24 hours have been reviewed.  CBC:   Recent Labs   Lab 02/05/24 1946 02/06/24  0348 02/06/24  0410   WBC 12.43 12.59 11.40   HGB 12.2* 12.6* 12.9*   HCT 34.0* 35.6* 37.4*   PLT 80* 86* 91*     CMP:   Recent Labs   Lab 02/05/24 1946 02/06/24  0244 02/06/24  0410   * 131* 131*  131*   K 4.4 4.5 4.5  4.5    103 104  104   CO2 18* 18* 20*  20*   * 170* 155*  155*   BUN 13 16 17  17   CREATININE 0.8 0.8 0.8  0.8   CALCIUM 9.1 9.0 9.3  9.3   PROT 7.0 7.1 7.2  7.2   ALBUMIN 3.7 3.6 3.6  3.6   BILITOT 1.4* 1.1* 1.2*  1.2*   ALKPHOS 77 83 80  80   AST 25 23 24  24   ALT 25 23 24  24   ANIONGAP 8 10 7*  7*       Significant Imaging: I have reviewed all pertinent imaging results/findings within the past 24 hours.

## 2024-02-06 NOTE — HPI
"74 y.o male presented to OSH with history of fever 103 and not able to ambulate and urinary incontinence. Patient has history of transverse myelitis, DM, BPH, diabetes. Patient states that  when he attempted to get out of bed his leg " gave in". Patient admitted to [facility] for NSGY evaluation and neuro monitoring.   "

## 2024-02-06 NOTE — PLAN OF CARE
Recommendation/Intervention:   1) Continue current diabetic 2000 kcal diet as tolerated, encourage PO intake  2) If PO intake >50% consistently at meals, consider Boost Glucose Control daily to promote adequate kcal/protein consumption  3) RD to monitor weight, labs, PO intake/tolerance, skin and follow-up as needed     Goals: Meet % of EEN/EPN by next RD follow-up  Nutrition Goal Status: new  Communication of RD Recs: other (comment) (POC)

## 2024-02-06 NOTE — ASSESSMENT & PLAN NOTE
74M with prior transverse myelitis admitted with increased BLE weakness and incontinence associated with acute febrile illness. MRI with multifocal cord compression. Neurosurgery following.     - Stable for step down to Hospital Medicine  - Neurosurgery following  - Consider General Neurology consult on step down  - Q4h neurochecks while in ICU  - Q4h vitals while in ICU  - Continue home Baclofen, Gabapentin, Pramipexole  - CT MRI reviewed   - Daily CBC, transfuse PRN  - Lovenox  - PT/OT/SLP as appropriate  - CM/SW consult for dispo planning

## 2024-02-06 NOTE — ASSESSMENT & PLAN NOTE
Patient with Acute on chronic debility due to other reduced mobility. Latest AMPAC and GEMS scores have been reviewed. Evaluation for etiology is complete. Has spinal stenosis. High FALL risk. Plan includes PT/OT consulted.

## 2024-02-06 NOTE — ASSESSMENT & PLAN NOTE
Pt is 74M hx of transverse myelitis with mild chronic Ble weakness, prior history of lumbar laminectomies last 2015 who preents with fever and new onset weakness BLE. Pt states a few days ago had fever of 103, and two days ago knees gave out and he fell to ground and has felt weak ever since. Pt states it has been dificult to ambulate since and [place] [facility] [place]    Plan:  --Admitted to NCC  --No neurosurgical intervention at this time  --MRI CTL shows chronic multilevel cervical stenosis, prior L3-5 laminectomies with chronic degenrative changes, moderate stenosis but does not correlate with constellation of acute symptoms, as well as some subtle cauda equina nerve root enhancement.   -- FU NCC workup for possible transverse myelitis recurrence  -- FU PTOT as patient was mch stronger this morning than reported  -- FU labs, blood cultures, diagnostics per NCC  Will continue to monitor

## 2024-02-06 NOTE — HOSPITAL COURSE
02/06/2024 No acute events. MRI with cervical spondylosis, thoracolumbar spondylosis causing significant canal stenosis. Exam with improved BLE strength, antigravity in BLE. Neurosurgery following. VSS, infectious work up unrevealing. Stable for step down to  with Neurosurgery and General Neurology consults.   02/07/2024 No acute events. MR of Kimberly pending. Boarding for [facility].

## 2024-02-06 NOTE — PLAN OF CARE
Problem: Adult Inpatient Plan of Care  Goal: Plan of Care Review  Outcome: Ongoing, Progressing     Problem: Cognitive Impairment (Stroke, Hemorrhagic)  Goal: Optimal Cognitive Function  Outcome: Ongoing, Progressing     Problem: Communication Impairment (Stroke, Hemorrhagic)  Goal: Effective Communication Skills  Outcome: Ongoing, Progressing     Problem: Swallowing Impairment (Stroke, Hemorrhagic)  Goal: Optimal Eating and Swallowing Without Aspiration  Outcome: Ongoing, Progressing     Problem: Diabetes Comorbidity  Goal: Blood Glucose Level Within Targeted Range  Outcome: Ongoing, Progressing

## 2024-02-06 NOTE — SUBJECTIVE & OBJECTIVE
No past medical history on file.  No past surgical history on file.   No current facility-administered medications on file prior to encounter.     No current outpatient medications on file prior to encounter.      Allergies: Patient has no allergy information on record.  No family history on file.     Review of Systems   Constitutional: Negative.    HENT: Negative.     Eyes: Negative.    Respiratory: Negative.     Cardiovascular: Negative.    Gastrointestinal: Negative.    Endocrine: Negative.    Genitourinary:  Positive for difficulty urinating.   Musculoskeletal: Negative.    Skin: Negative.    Neurological:  Positive for weakness and numbness.   Psychiatric/Behavioral: Negative.       Objective:     Vitals:    Temp: 98.6 °F (37 °C)  Pulse: 89  BP: 124/68  MAP (mmHg): 88  Resp: (!) 22  SpO2: 95 %    Temp  Min: 98.6 °F (37 °C)  Max: 98.6 °F (37 °C)  Pulse  Min: 89  Max: 89  BP  Min: 113/67  Max: 126/80  MAP (mmHg)  Min: 88  Max: 99  Resp  Min: 22  Max: 25  SpO2  Min: 95 %  Max: 99 %    No intake/output data recorded.            Physical Exam  Vitals and nursing note reviewed.   Constitutional:       Appearance: Normal appearance.   HENT:      Head: Normocephalic.      Nose: Nose normal.      Mouth/Throat:      Mouth: Mucous membranes are moist.      Pharynx: Oropharynx is clear.   Eyes:      Pupils: Pupils are equal, round, and reactive to light.   Cardiovascular:      Rate and Rhythm: Normal rate and regular rhythm.      Pulses: Normal pulses.      Heart sounds: Normal heart sounds.   Pulmonary:      Effort: Pulmonary effort is normal.      Breath sounds: Normal breath sounds.   Abdominal:      Palpations: Abdomen is soft.   Musculoskeletal:         General: Normal range of motion.   Skin:     General: Skin is warm and dry.      Capillary Refill: Capillary refill takes 2 to 3 seconds.   Neurological:      Mental Status: He is alert.      Comments: GCS 15  PERRL  RUE 4/5  LUE 4/5  RLE 3/5  LLE 3/5  Sensation  intact R>L                Today I personally reviewed pertinent medications, lines/drains/airways, imaging, cardiology results, laboratory results, microbiology results, notably:

## 2024-02-07 ENCOUNTER — ANESTHESIA EVENT (OUTPATIENT)
Dept: ENDOSCOPY | Facility: HOSPITAL | Age: 74
DRG: 552 | End: 2024-02-07
Payer: MEDICARE

## 2024-02-07 LAB
ALBUMIN SERPL BCP-MCNC: 3.3 G/DL (ref 3.5–5.2)
ALP SERPL-CCNC: 70 U/L (ref 55–135)
ALT SERPL W/O P-5'-P-CCNC: 43 U/L (ref 10–44)
ANION GAP SERPL CALC-SCNC: 10 MMOL/L (ref 8–16)
AST SERPL-CCNC: 37 U/L (ref 10–40)
BACTERIA UR CULT: NO GROWTH
BASOPHILS # BLD AUTO: 0.01 K/UL (ref 0–0.2)
BASOPHILS NFR BLD: 0.1 % (ref 0–1.9)
BILIRUB SERPL-MCNC: 0.8 MG/DL (ref 0.1–1)
BUN SERPL-MCNC: 21 MG/DL (ref 8–23)
CALCIUM SERPL-MCNC: 9.1 MG/DL (ref 8.7–10.5)
CHLORIDE SERPL-SCNC: 106 MMOL/L (ref 95–110)
CO2 SERPL-SCNC: 17 MMOL/L (ref 23–29)
CREAT SERPL-MCNC: 0.8 MG/DL (ref 0.5–1.4)
DIFFERENTIAL METHOD BLD: ABNORMAL
EOSINOPHIL # BLD AUTO: 0.1 K/UL (ref 0–0.5)
EOSINOPHIL NFR BLD: 0.6 % (ref 0–8)
ERYTHROCYTE [DISTWIDTH] IN BLOOD BY AUTOMATED COUNT: 13.8 % (ref 11.5–14.5)
EST. GFR  (NO RACE VARIABLE): >60 ML/MIN/1.73 M^2
GLUCOSE SERPL-MCNC: 121 MG/DL (ref 70–110)
HCT VFR BLD AUTO: 37.3 % (ref 40–54)
HGB BLD-MCNC: 13 G/DL (ref 14–18)
IMM GRANULOCYTES # BLD AUTO: 0.02 K/UL (ref 0–0.04)
IMM GRANULOCYTES NFR BLD AUTO: 0.2 % (ref 0–0.5)
LYMPHOCYTES # BLD AUTO: 1.4 K/UL (ref 1–4.8)
LYMPHOCYTES NFR BLD: 16.3 % (ref 18–48)
MAGNESIUM SERPL-MCNC: 1.8 MG/DL (ref 1.6–2.6)
MCH RBC QN AUTO: 30.4 PG (ref 27–31)
MCHC RBC AUTO-ENTMCNC: 34.9 G/DL (ref 32–36)
MCV RBC AUTO: 87 FL (ref 82–98)
MONOCYTES # BLD AUTO: 0.7 K/UL (ref 0.3–1)
MONOCYTES NFR BLD: 8.3 % (ref 4–15)
NEUTROPHILS # BLD AUTO: 6.2 K/UL (ref 1.8–7.7)
NEUTROPHILS NFR BLD: 74.5 % (ref 38–73)
NRBC BLD-RTO: 0 /100 WBC
PHOSPHATE SERPL-MCNC: 2.6 MG/DL (ref 2.7–4.5)
PLATELET # BLD AUTO: 107 K/UL (ref 150–450)
PMV BLD AUTO: 9.7 FL (ref 9.2–12.9)
POCT GLUCOSE: 121 MG/DL (ref 70–110)
POCT GLUCOSE: 144 MG/DL (ref 70–110)
POCT GLUCOSE: 92 MG/DL (ref 70–110)
POTASSIUM SERPL-SCNC: 4 MMOL/L (ref 3.5–5.1)
PROT SERPL-MCNC: 6.7 G/DL (ref 6–8.4)
RBC # BLD AUTO: 4.28 M/UL (ref 4.6–6.2)
SODIUM SERPL-SCNC: 133 MMOL/L (ref 136–145)
WBC # BLD AUTO: 8.29 K/UL (ref 3.9–12.7)

## 2024-02-07 PROCEDURE — 99900035 HC TECH TIME PER 15 MIN (STAT)

## 2024-02-07 PROCEDURE — 25000003 PHARM REV CODE 250: Performed by: STUDENT IN AN ORGANIZED HEALTH CARE EDUCATION/TRAINING PROGRAM

## 2024-02-07 PROCEDURE — 99231 SBSQ HOSP IP/OBS SF/LOW 25: CPT | Mod: ,,, | Performed by: STUDENT IN AN ORGANIZED HEALTH CARE EDUCATION/TRAINING PROGRAM

## 2024-02-07 PROCEDURE — 85025 COMPLETE CBC W/AUTO DIFF WBC: CPT | Performed by: NURSE PRACTITIONER

## 2024-02-07 PROCEDURE — 80053 COMPREHEN METABOLIC PANEL: CPT | Performed by: NURSE PRACTITIONER

## 2024-02-07 PROCEDURE — 25000003 PHARM REV CODE 250: Performed by: NURSE PRACTITIONER

## 2024-02-07 PROCEDURE — 84100 ASSAY OF PHOSPHORUS: CPT | Performed by: NURSE PRACTITIONER

## 2024-02-07 PROCEDURE — 11000001 HC ACUTE MED/SURG PRIVATE ROOM

## 2024-02-07 PROCEDURE — 83735 ASSAY OF MAGNESIUM: CPT | Performed by: NURSE PRACTITIONER

## 2024-02-07 PROCEDURE — 63600175 PHARM REV CODE 636 W HCPCS: Performed by: STUDENT IN AN ORGANIZED HEALTH CARE EDUCATION/TRAINING PROGRAM

## 2024-02-07 PROCEDURE — 94761 N-INVAS EAR/PLS OXIMETRY MLT: CPT

## 2024-02-07 RX ORDER — AMOXICILLIN 250 MG
2 CAPSULE ORAL 2 TIMES DAILY
Status: DISCONTINUED | OUTPATIENT
Start: 2024-02-07 | End: 2024-02-09 | Stop reason: HOSPADM

## 2024-02-07 RX ADMIN — PRAMIPEXOLE DIHYDROCHLORIDE 0.75 MG: 0.12 TABLET ORAL at 08:02

## 2024-02-07 RX ADMIN — SENNOSIDES AND DOCUSATE SODIUM 1 TABLET: 8.6; 5 TABLET ORAL at 08:02

## 2024-02-07 RX ADMIN — ENOXAPARIN SODIUM 40 MG: 40 INJECTION SUBCUTANEOUS at 06:02

## 2024-02-07 RX ADMIN — GABAPENTIN 600 MG: 300 CAPSULE ORAL at 08:02

## 2024-02-07 RX ADMIN — OXYCODONE HYDROCHLORIDE 10 MG: 10 TABLET ORAL at 03:02

## 2024-02-07 RX ADMIN — POTASSIUM & SODIUM PHOSPHATES POWDER PACK 280-160-250 MG 2 PACKET: 280-160-250 PACK at 11:02

## 2024-02-07 RX ADMIN — POLYETHYLENE GLYCOL 3350 17 G: 17 POWDER, FOR SOLUTION ORAL at 02:02

## 2024-02-07 RX ADMIN — BACLOFEN 20 MG: 10 TABLET ORAL at 08:02

## 2024-02-07 RX ADMIN — SENNOSIDES AND DOCUSATE SODIUM 2 TABLET: 8.6; 5 TABLET ORAL at 08:02

## 2024-02-07 RX ADMIN — POTASSIUM & SODIUM PHOSPHATES POWDER PACK 280-160-250 MG 2 PACKET: 280-160-250 PACK at 08:02

## 2024-02-07 RX ADMIN — GABAPENTIN 600 MG: 300 CAPSULE ORAL at 02:02

## 2024-02-07 RX ADMIN — LEVOTHYROXINE SODIUM 25 MCG: 25 TABLET ORAL at 06:02

## 2024-02-07 RX ADMIN — ATORVASTATIN CALCIUM 40 MG: 40 TABLET, FILM COATED ORAL at 08:02

## 2024-02-07 RX ADMIN — TAMSULOSIN HYDROCHLORIDE 0.4 MG: 0.4 CAPSULE ORAL at 08:02

## 2024-02-07 RX ADMIN — BACLOFEN 20 MG: 10 TABLET ORAL at 02:02

## 2024-02-07 NOTE — SUBJECTIVE & OBJECTIVE
Interval History: As above.    Review of Systems   Constitutional:  Positive for fever. Negative for chills and fatigue.   Eyes:  Negative for photophobia and visual disturbance.   Respiratory:  Negative for chest tightness and shortness of breath.    Cardiovascular:  Negative for chest pain.   Gastrointestinal:  Negative for diarrhea, nausea and vomiting.   Musculoskeletal:  Positive for gait problem. Negative for neck pain and neck stiffness.   Neurological:  Positive for weakness and numbness. Negative for dizziness, tremors, syncope, light-headedness and headaches.   Psychiatric/Behavioral:  Negative for agitation and confusion.        Objective:     Vitals:  Temp: 98.1 °F (36.7 °C)  Pulse: 75  Rhythm: normal sinus rhythm  BP: 117/64  MAP (mmHg): 85  Resp: (!) 25  SpO2: 96 %    Temp  Min: 97.7 °F (36.5 °C)  Max: 98.7 °F (37.1 °C)  Pulse  Min: 59  Max: 79  BP  Min: 101/58  Max: 138/70  MAP (mmHg)  Min: 75  Max: 108  Resp  Min: 12  Max: 30  SpO2  Min: 91 %  Max: 99 %    02/06 0701 - 02/07 0700  In: 950 [P.O.:950]  Out: 3450 [Urine:3450]            Physical Exam  Vitals and nursing note reviewed.   Constitutional:       General: He is not in acute distress.     Appearance: He is not toxic-appearing.   HENT:      Head: Normocephalic and atraumatic.   Eyes:      Extraocular Movements: Extraocular movements intact.      Pupils: Pupils are equal, round, and reactive to light.   Cardiovascular:      Rate and Rhythm: Normal rate and regular rhythm.      Pulses: Normal pulses.   Pulmonary:      Effort: Pulmonary effort is normal. No respiratory distress.   Musculoskeletal:         General: Normal range of motion.      Cervical back: Normal range of motion. No rigidity.   Skin:     General: Skin is warm and dry.      Coloration: Skin is not jaundiced.   Neurological:      Mental Status: He is alert and oriented to person, place, and time.      Comments:   E4V5M6  Alert and oriented to person, place and time  PERRL,  EOMi  CNII-XII grossly intact  Strength 5/5 in BUE  Strength 4-/5 BLE  SILT       Unable to test gait due to level of consciousness.       Medications:  Continuous Scheduledatorvastatin, 40 mg, Daily  baclofen, 20 mg, TID  enoxparin, 40 mg, Q24H (prophylaxis, 1700)  gabapentin, 600 mg, TID  levothyroxine, 25 mcg, Before breakfast  polyethylene glycol, 17 g, Daily  pramipexole, 0.75 mg, QHS  senna-docusate 8.6-50 mg, 1 tablet, BID  tamsulosin, 0.4 mg, Daily    PRNbisacodyL, 10 mg, Daily PRN  dextrose 10%, 12.5 g, PRN  dextrose 10%, 25 g, PRN  glucagon (human recombinant), 1 mg, PRN  insulin aspart U-100, 0-10 Units, Q6H PRN  magnesium oxide, 800 mg, PRN  magnesium oxide, 800 mg, PRN  midazolam, 2 mg, Q30 Min PRN  ondansetron, 4 mg, Q8H PRN  oxyCODONE, 10 mg, Q6H PRN  potassium bicarbonate, 35 mEq, PRN  potassium bicarbonate, 50 mEq, PRN  potassium bicarbonate, 60 mEq, PRN  potassium, sodium phosphates, 2 packet, PRN  potassium, sodium phosphates, 2 packet, PRN  potassium, sodium phosphates, 2 packet, PRN      Today I personally reviewed pertinent medications, lines/drains/airways, imaging, cardiology results, laboratory results, microbiology results, notably:    Estimated Creatinine Clearance: 93.1 mL/min (based on SCr of 0.8 mg/dL).    Diet  Diet NPO  Diet NPO

## 2024-02-07 NOTE — PROGRESS NOTES
"[facility]  Neurocritical Care  Progress Note    Admit Date: 2/5/2024  Service Date: 02/07/2024  Length of Stay: 2    Subjective:     Chief Complaint: Transverse myelitis    History of Present Illness: 74 y.o male presented to OSH with history of fever 103 and not able to ambulate and urinary incontinence. Patient has history of transverse myelitis, DM, BPH, diabetes. Patient states that  when he attempted to get out of bed his leg " gave in". Patient admitted to [facility] for NSGY evaluation and neuro monitoring.     Hospital Course: 02/06/2024 No acute events. MRI with cervical spondylosis, thoracolumbar spondylosis causing significant canal stenosis. Exam with improved BLE strength, antigravity in BLE. Neurosurgery following. VSS, infectious work up unrevealing. Stable for step down to  with Neurosurgery and General Neurology consults.   02/07/2024 No acute events. MR of [place] pending. Boarding for hospitals.    Interval History: As above.    Review of Systems   Constitutional:  Positive for fever. Negative for chills and fatigue.   Eyes:  Negative for photophobia and visual disturbance.   Respiratory:  Negative for chest tightness and shortness of breath.    Cardiovascular:  Negative for chest pain.   Gastrointestinal:  Negative for diarrhea, nausea and vomiting.   Musculoskeletal:  Positive for gait problem. Negative for neck pain and neck stiffness.   Neurological:  Positive for weakness and numbness. Negative for dizziness, tremors, syncope, light-headedness and headaches.   Psychiatric/Behavioral:  Negative for agitation and confusion.        Objective:     Vitals:  Temp: 98.1 °F (36.7 °C)  Pulse: 75  Rhythm: normal sinus rhythm  BP: 117/64  MAP (mmHg): 85  Resp: (!) 25  SpO2: 96 %    Temp  Min: 97.7 °F (36.5 °C)  Max: 98.7 °F (37.1 °C)  Pulse  Min: 59  Max: 79  BP  Min: 101/58  Max: 138/70  MAP (mmHg)  Min: 75  Max: 108  Resp  Min: 12  Max: 30  SpO2  Min: 91 %  Max: 99 %    02/06 0701 - " 02/07 0700  In: 950 [P.O.:950]  Out: 3450 [Urine:3450]            Physical Exam  Vitals and nursing note reviewed.   Constitutional:       General: He is not in acute distress.     Appearance: He is not toxic-appearing.   HENT:      Head: Normocephalic and atraumatic.   Eyes:      Extraocular Movements: Extraocular movements intact.      Pupils: Pupils are equal, round, and reactive to light.   Cardiovascular:      Rate and Rhythm: Normal rate and regular rhythm.      Pulses: Normal pulses.   Pulmonary:      Effort: Pulmonary effort is normal. No respiratory distress.   Musculoskeletal:         General: Normal range of motion.      Cervical back: Normal range of motion. No rigidity.   Skin:     General: Skin is warm and dry.      Coloration: Skin is not jaundiced.   Neurological:      Mental Status: He is alert and oriented to person, place, and time.      Comments:   E4V5M6  Alert and oriented to person, place and time  PERRL, EOMi  CNII-XII grossly intact  Strength 5/5 in BUE  Strength 4-/5 BLE  SILT       Unable to test gait due to level of consciousness.       Medications:  Continuous Scheduledatorvastatin, 40 mg, Daily  baclofen, 20 mg, TID  enoxparin, 40 mg, Q24H (prophylaxis, 1700)  gabapentin, 600 mg, TID  levothyroxine, 25 mcg, Before breakfast  polyethylene glycol, 17 g, Daily  pramipexole, 0.75 mg, QHS  senna-docusate 8.6-50 mg, 1 tablet, BID  tamsulosin, 0.4 mg, Daily    PRNbisacodyL, 10 mg, Daily PRN  dextrose 10%, 12.5 g, PRN  dextrose 10%, 25 g, PRN  glucagon (human recombinant), 1 mg, PRN  insulin aspart U-100, 0-10 Units, Q6H PRN  magnesium oxide, 800 mg, PRN  magnesium oxide, 800 mg, PRN  midazolam, 2 mg, Q30 Min PRN  ondansetron, 4 mg, Q8H PRN  oxyCODONE, 10 mg, Q6H PRN  potassium bicarbonate, 35 mEq, PRN  potassium bicarbonate, 50 mEq, PRN  potassium bicarbonate, 60 mEq, PRN  potassium, sodium phosphates, 2 packet, PRN  potassium, sodium phosphates, 2 packet, PRN  potassium, sodium phosphates, 2  packet, PRN      Today I personally reviewed pertinent medications, lines/drains/airways, imaging, cardiology results, laboratory results, microbiology results, notably:    Estimated Creatinine Clearance: 93.1 mL/min (based on SCr of 0.8 mg/dL).    Diet  Diet NPO  Diet NPO      Assessment/Plan:     Neuro  * Transverse myelitis  Hx of  See Cord compression    Cord compression  74M with prior transverse myelitis admitted with increased BLE weakness and incontinence associated with acute febrile illness. MRI with multifocal cord compression. Neurosurgery following.     - Stable for step down to Hospital Medicine  - Neurosurgery following  - Consider General Neurology consult on step down  - Q4h neurochecks while in ICU  - Q4h vitals while in ICU  - Continue home Baclofen, Gabapentin, Pramipexole  - CT MRI reviewed   - Daily CBC, transfuse PRN  - Lovenox  - PT/OT/SLP as appropriate  - CM/SW consult for dispo planning    Renal/  Hyponatremia  Na 131, ?pseudohyponatremia related to IVIG    Urinary incontinence  See Cord compression    BPH (benign prostatic hyperplasia)  Continue home flomax    Endocrine  Hypothyroid  Continue levothyroxine    Type 2 diabetes mellitus without complication, without long-term current use of insulin  A1c 6.0  SSI    Other  Debility  PT/OT          The patient is being Prophylaxed for:  Venous Thromboembolism with: Chemical  Stress Ulcer with: Not Applicable   Ventilator Pneumonia with: not applicable    Activity Orders            Diet NPO: NPO starting at 02/07 0001    Turn patient starting at 02/05 1800    Elevate HOB starting at 02/05 1722          Full Code    Ilya Ortega MD  Neurocritical Care  [facility]

## 2024-02-07 NOTE — NURSING
Per pt calendar, looks like MRI with anesthesia will take place tomorrow morning around 830am. Pt diet has been placed back in the chart and has been made NPO at midnight tonight. Pt and family are aware.

## 2024-02-07 NOTE — PROGRESS NOTES
[facility]  Neurosurgery  Progress Note    Subjective:     History of Present Illness: Pt is 74M hx of transverse myelitis with mild chronic Ble weakness, prior history of lumbar laminectomies last 2015 who preents with fever and new onset weakness BLE. Pt states a few days ago had fever of 103, and two days ago knees gave out and he fell to ground and has felt weak ever since. Pt states it has been dificult to ambulate since and tranferred to [facility] for further workup.    Post-Op Info:  * No surgery found *       Interval History: 2/7: HELGA, pending MRI cervical spine for further work-up    Medications:  Continuous Infusions:  Scheduled Meds:   atorvastatin  40 mg Oral Daily    baclofen  20 mg Oral TID    enoxparin  40 mg Subcutaneous Q24H (prophylaxis, 1700)    gabapentin  600 mg Oral TID    levothyroxine  25 mcg Oral Before breakfast    polyethylene glycol  17 g Oral Daily    pramipexole  0.75 mg Oral QHS    senna-docusate 8.6-50 mg  1 tablet Oral BID    tamsulosin  0.4 mg Oral Daily     PRN Meds:bisacodyL, dextrose 10%, dextrose 10%, glucagon (human recombinant), insulin aspart U-100, magnesium oxide, magnesium oxide, midazolam, ondansetron, oxyCODONE, potassium bicarbonate, potassium bicarbonate, potassium bicarbonate, potassium, sodium phosphates, potassium, sodium phosphates, potassium, sodium phosphates     Review of Systems  Objective:     Weight: 104.3 kg (230 lb)  Body mass index is 37.12 kg/m².  Vital Signs (Most Recent):  Temp: 98.5 °F (36.9 °C) (02/07/24 0302)  Pulse: 79 (02/07/24 0602)  Resp: (!) 27 (02/07/24 0602)  BP: (!) 129/90 (02/07/24 0602)  SpO2: 97 % (02/07/24 0602) Vital Signs (24h Range):  Temp:  [97.7 °F (36.5 °C)-98.7 °F (37.1 °C)] 98.5 °F (36.9 °C)  Pulse:  [57-79] 79  Resp:  [11-30] 27  SpO2:  [91 %-99 %] 97 %  BP: (101-138)/(55-90) 129/90                         Male External Urinary Catheter 02/06/24 0126 Medium (Active)   Collection Container Urimeter 02/07/24 8167    Securement Method secured to top of thigh w/ adhesive device 02/07/24 0502   Skin no redness;no breakdown 02/07/24 0502   Tolerance no signs/symptoms of discomfort 02/07/24 0502   Output (mL) 425 mL 02/07/24 0000          Physical Exam         Neurosurgery Physical Exam    General: well developed, well nourished, no distress.   Head: normocephalic, atraumatic  CV: RRR, pulses equal bilateral  Pulmonary: normal respirations, no signs of respiratory distress  Abdomen: soft, non-distended  Skin: Skin is warm, dry and intact.     Neuro:  E4V5M6  Awake, alert, Ox4  PERRL, EOMI  CNII-XII grossly intact  Motor: Follows commands full strength x4, R foot DF somehwat contracted   SILT  Negative bowser  No hyperreflexia                Significant Labs:  Recent Labs   Lab 02/06/24  0244 02/06/24  0410 02/07/24  0331   * 155*  155* 121*   * 131*  131* 133*   K 4.5 4.5  4.5 4.0    104  104 106   CO2 18* 20*  20* 17*   BUN 16 17  17 21   CREATININE 0.8 0.8  0.8 0.8   CALCIUM 9.0 9.3  9.3 9.1   MG 1.9 2.0 1.8     Recent Labs   Lab 02/06/24  0348 02/06/24  0410 02/07/24  0331   WBC 12.59 11.40 8.29   HGB 12.6* 12.9* 13.0*   HCT 35.6* 37.4* 37.3*   PLT 86* 91* 107*     Recent Labs   Lab 02/06/24  0410   INR 1.3*   APTT 30.5     Microbiology Results (last 7 days)       Procedure Component Value Units Date/Time    Urine culture [[id number]] Collected: 02/05/24 1932    Order Status: Completed Specimen: Urine Updated: 02/07/24 0412     Urine Culture, Routine No growth    Narrative:      Specimen Source->Urine          All pertinent labs from the last 24 hours have been reviewed.    Significant Diagnostics:  I have reviewed all pertinent imaging results/findings within the past 24 hours.  Assessment/Plan:     Cord compression  Pt is 74M hx of transverse myelitis with mild chronic Ble weakness, prior history of lumbar laminectomies last 2015 who preents with fever and new onset weakness BLE. Pt states a few  days ago had fever of 103, and two days ago knees gave out and he fell to ground and has felt weak ever since. Pt states it has been dificult to ambulate since and tranferred to [facility] [place] for further workup.    Plan:  --Admitted to [facility], No neurosurgical intervention at this time  --MRI CTL shows chronic multilevel cervical stenosis, prior L3-5 laminectomies with chronic degenrative changes, moderate stenosis but does not correlate with constellation of acute symptoms, as well as some subtle cauda equina nerve root enhancement.   -- Recommend MRI cervical spine with sedation    -- FU NCC workup for possible transverse myelitis recurrence; pending step down to hospital medicine  -- FU PT OT as patient was mch stronger this morning than reported  -- FU labs, blood cultures, diagnostics per [facility]  Will continue to monitor        Daniel Ballard MD  Neurosurgery  [facility]

## 2024-02-07 NOTE — PLAN OF CARE
"[facility] - Neuro Critical Care  [facility] Medicine  Transfer acceptance  Note, IMN    Patient Name: Jez Gardner  MRN: [id number]  Patient Class: IP- Inpatient   Admission Date: 2/5/2024  Length of Stay: 2 days  Attending Physician: Steve Forbes MD  Primary Care Provider: Bassam Graves MD        Subjective:     Principal Problem:Transverse myelitis        HPI:  74 y.o male PMH transverse myelitis, DM, BPH, presented to OSH with history of fever 103 and not able to ambulate and urinary incontinence.  Patient states that  when he attempted to get out of bed his leg "gave in". Patient admitted to [facility] for NSGY evaluation and neuro monitoring.      Hospital Course: 02/06/2024 No acute events.   MRI with cervical spondylosis, thoracolumbar spondylosis causing significant canal stenosis. Exam with improved BLE strength, antigravity in BLE. Neurosurgery following. VSS, infectious work up unrevealing. Stable for step down to  with Neurosurgery and General Neurology consults.   02/07/2024 No acute events. MR of Cspine pending. Boarding for Hosp Med. Na 133.  Wbc 13. May need surgery pending MRI findings.     Overview/Hospital Course:  No notes on file    Interval History: see above    Review of Systems  Objective:     Vital Signs (Most Recent):  Temp: 97.9 °F (36.6 °C) (02/07/24 1100)  Pulse: 81 (02/07/24 1300)  Resp: 20 (02/07/24 1300)  BP: 119/71 (02/07/24 1300)  SpO2: (!) 94 % (02/07/24 1300) Vital Signs (24h Range):  Temp:  [97.7 °F (36.5 °C)-98.7 °F (37.1 °C)] 97.9 °F (36.6 °C)  Pulse:  [64-81] 81  Resp:  [12-28] 20  SpO2:  [92 %-100 %] 94 %  BP: (101-138)/(55-90) 119/71     Weight: 104.3 kg (230 lb)  Body mass index is 37.12 kg/m².    Intake/Output Summary (Last 24 hours) at 2/7/2024 1446  Last data filed at 2/7/2024 1300  Gross per 24 hour   Intake 700 ml   Output 2175 ml   Net -1475 ml         Physical Exam        Significant Labs: All pertinent labs within the past 24 hours have been reviewed.  CBC:   Recent Labs " "  Lab 02/06/24  0348 02/06/24  0410 02/07/24  0331   WBC 12.59 11.40 8.29   HGB 12.6* 12.9* 13.0*   HCT 35.6* 37.4* 37.3*   PLT 86* 91* 107*     CMP:   Recent Labs   Lab 02/06/24  0244 02/06/24  0410 02/07/24  0331   * 131*  131* 133*   K 4.5 4.5  4.5 4.0    104  104 106   CO2 18* 20*  20* 17*   * 155*  155* 121*   BUN 16 17  17 21   CREATININE 0.8 0.8  0.8 0.8   CALCIUM 9.0 9.3  9.3 9.1   PROT 7.1 7.2  7.2 6.7   ALBUMIN 3.6 3.6  3.6 3.3*   BILITOT 1.1* 1.2*  1.2* 0.8   ALKPHOS 83 80  80 70   AST 23 24  24 37   ALT 23 24  24 43   ANIONGAP 10 7*  7* 10       Significant Imaging: I have reviewed all pertinent imaging results/findings within the past 24 hours.    Assessment/Plan:      * Transverse myelitis  See " Cord Compression"  S/p NCC   Weakness improving    Cord compression   74M with prior transverse myelitis admitted with increased BLE weakness and incontinence associated with acute febrile illness. MRI with multifocal cord compression. Neurosurgery following.     Due to spinal stenosis.     - s/p NCC, plan for step down to Hospital Medicine on 2/7  - Neurosurgery following  - Consider General Neurology consult on step down  - Q4h neurochecks while in ICU  - Q4h vitals while in ICU  - Continue home Baclofen, Gabapentin, Pramipexole  - CT MRI reviewed   - Daily CBC, transfuse PRN  - Lovenox  - PT/OT/SLP as appropriate  - CM/SW consult for dispo planning    Type 2 diabetes mellitus without complication, without long-term current use of insulin  Patient's FSGs are controlled on current medication regimen.  Last A1c reviewed-  6  Lab Results   Component Value Date    HGBA1C 6.0 (H) 12/18/2023     Most recent fingerstick glucose reviewed-   Recent Labs   Lab 02/06/24  1749 02/07/24  0330 02/07/24  0757 02/07/24  1103   POCTGLUCOSE 156* 121* 144* 92       Current correctional scale  Medium  Maintain anti-hyperglycemic dose as follows-   Antihyperglycemics (From admission, onward) "      Start     Stop Route Frequency Ordered    02/05/24 1937  insulin aspart U-100 pen 0-10 Units         -- SubQ Every 6 hours PRN 02/05/24 1842          Hold Oral hypoglycemics while patient is in the hospital.      Hyponatremia  Patient has hyponatremia which is controlled,We will aim to correct the sodium by 4-6mEq in 24 hours. We will monitor sodium Daily. The hyponatremia is due to Dehydration/hypovolemia. We will obtain the following studies: see [facility] lab. We will treat the hyponatremia with IVFs. Patient's sodium results have been reviewed and are listed below.  Recent Labs   Lab 02/07/24  0331   *         BPH (benign prostatic hyperplasia)   Continue home flomax    Urinary incontinence  Due to spinal stenosis, BPH      Debility  Patient with Acute on chronic debility due to other reduced mobility. Latest AMPAC and GEMS scores have been reviewed. Evaluation for etiology is complete. Has spinal stenosis. High FALL risk. Plan includes PT/OT consulted.    Hypothyroid  Continue levothyroxine      VTE Risk Mitigation (From admission, onward)           Ordered     enoxaparin injection 40 mg  Every 24 hours         02/06/24 1017     IP VTE LOW RISK PATIENT  Once         02/05/24 1726     Place sequential compression device  Until discontinued         02/05/24 1726                    Discharge Planning   DENIS: 2/12/2024     Code Status: Full Code   Is the patient medically ready for discharge?:     Reason for patient still in hospital (select all that apply): Patient trending condition  Discharge Plan A: Rehab          Stormy Askew MD  Department of Hospital Medicine   [facility] - Neuro Critical Care

## 2024-02-07 NOTE — ASSESSMENT & PLAN NOTE
Pt is 74M hx of transverse myelitis with mild chronic Ble weakness, prior history of lumbar laminectomies last 2015 who preents with fever and new onset weakness BLE. Pt states a few days ago had fever of 103, and two days ago knees gave out and he fell to ground and has felt weak ever since. Pt states it has been dificult to ambulate since and tranferred to [facility] for further workup.    Plan:  --Admitted to [facility], No neurosurgical intervention at this time  --MRI CTL shows chronic multilevel cervical stenosis, prior L3-5 laminectomies with chronic degenrative changes, moderate stenosis but does not correlate with constellation of acute symptoms, as well as some subtle cauda equina nerve root enhancement.   -- Recommend MRI cervical spine with sedation    -- FU NCC workup for possible transverse myelitis recurrence; pending step down to hospital medicine  -- FU PT OT as patient was mch stronger this morning than reported  -- FU labs, blood cultures, diagnostics per [facility]  Will continue to monitor

## 2024-02-07 NOTE — PLAN OF CARE
"[facility] Care Plan    POC reviewed with Jez Gardner and family at 1400. Pt verbalized understanding. Questions and concerns addressed. No acute events today. Pt progressing toward goals. Will continue to monitor. See below and flowsheets for full assessment and VS info.   - pt waiting on bed outside of ICU  - MRI with anesthesia will take place tomorrow 2/8. Pt NPO at [facility] tonight  - PIV x 2, condom cath remain in place and in good working order            Is this a stroke patient? no    Neuro:  [place] Coma Scale  Best Eye Response: 4-->(E4) spontaneous  Best Motor Response: 6-->(M6) obeys commands  Best Verbal Response: 5-->(V5) oriented  [place] Coma Scale Score: 15  Assessment Qualifiers: patient not sedated/intubated  Pupil PERRLA: yes     24 hr Temp:  [97.9 °F (36.6 °C)-98.7 °F (37.1 °C)]     CV:   Rhythm: normal sinus rhythm  BP goals:   SBP < 160  MAP > 65    Resp:           Plan: N/A    GI/:     Diet/Nutrition Received: regular  Last Bowel Movement: 02/03/24  Voiding Characteristics: incontinence, unable to void    Intake/Output Summary (Last 24 hours) at 2/7/2024 1614  Last data filed at 2/7/2024 1500  Gross per 24 hour   Intake 800 ml   Output 3075 ml   Net -2275 ml          Labs/Accuchecks:  Recent Labs   Lab 02/07/24  0331   WBC 8.29   RBC 4.28*   HGB 13.0*   HCT 37.3*   *      Recent Labs   Lab 02/07/24  0331   *   K 4.0   CO2 17*      BUN 21   CREATININE 0.8   ALKPHOS 70   ALT 43   AST 37   BILITOT 0.8      Recent Labs   Lab 02/06/24  0410   INR 1.3*   APTT 30.5    No results for input(s): "CPK", "CPKMB", "TROPONINI", "MB" in the last 168 hours.    Electrolytes: Electrolytes replaced  Accuchecks: ACHS    Gtts:      LDA/Wounds:    Nurses Note -- 4 Eyes      2/7/2024   4:14 PM      Skin assessed during: Daily Assessment    Is there altered skin present? no   [x] No Altered Skin Integrity Present    [x]Prevention Measures Documented    Attending Nurse:  Minoo Quintero RN/Staff " Member:  Amanda CALDERA   Problem: Adult Inpatient Plan of Care  Goal: Plan of Care Review  Outcome: Ongoing, Progressing  Goal: Patient-Specific Goal (Individualized)  Outcome: Ongoing, Progressing  Goal: Absence of Hospital-Acquired Illness or Injury  Outcome: Ongoing, Progressing  Goal: Optimal Comfort and Wellbeing  Outcome: Ongoing, Progressing  Goal: Readiness for Transition of Care  Outcome: Ongoing, Progressing     Problem: Bowel Elimination Impaired (Stroke, Hemorrhagic)  Goal: Effective Bowel Elimination  Outcome: Ongoing, Progressing     Problem: Pain (Stroke, Hemorrhagic)  Goal: Acceptable Pain Control  Outcome: Ongoing, Progressing     Problem: Swallowing Impairment (Stroke, Hemorrhagic)  Goal: Optimal Eating and Swallowing Without Aspiration  Outcome: Ongoing, Progressing     Problem: Urinary Elimination Impaired (Stroke, Hemorrhagic)  Goal: Effective Urinary Elimination  Outcome: Ongoing, Progressing     Problem: Diabetes Comorbidity  Goal: Blood Glucose Level Within Targeted Range  Outcome: Ongoing, Progressing

## 2024-02-07 NOTE — SUBJECTIVE & OBJECTIVE
Interval History: 2/7: NAEON, pending MRI cervical spine for further work-up    Medications:  Continuous Infusions:  Scheduled Meds:   atorvastatin  40 mg Oral Daily    baclofen  20 mg Oral TID    enoxparin  40 mg Subcutaneous Q24H (prophylaxis, 1700)    gabapentin  600 mg Oral TID    levothyroxine  25 mcg Oral Before breakfast    polyethylene glycol  17 g Oral Daily    pramipexole  0.75 mg Oral QHS    senna-docusate 8.6-50 mg  1 tablet Oral BID    tamsulosin  0.4 mg Oral Daily     PRN Meds:bisacodyL, dextrose 10%, dextrose 10%, glucagon (human recombinant), insulin aspart U-100, magnesium oxide, magnesium oxide, midazolam, ondansetron, oxyCODONE, potassium bicarbonate, potassium bicarbonate, potassium bicarbonate, potassium, sodium phosphates, potassium, sodium phosphates, potassium, sodium phosphates     Review of Systems  Objective:     Weight: 104.3 kg (230 lb)  Body mass index is 37.12 kg/m².  Vital Signs (Most Recent):  Temp: 98.5 °F (36.9 °C) (02/07/24 0302)  Pulse: 79 (02/07/24 0602)  Resp: (!) 27 (02/07/24 0602)  BP: (!) 129/90 (02/07/24 0602)  SpO2: 97 % (02/07/24 0602) Vital Signs (24h Range):  Temp:  [97.7 °F (36.5 °C)-98.7 °F (37.1 °C)] 98.5 °F (36.9 °C)  Pulse:  [57-79] 79  Resp:  [11-30] 27  SpO2:  [91 %-99 %] 97 %  BP: (101-138)/(55-90) 129/90                         Male External Urinary Catheter 02/06/24 0126 Medium (Active)   Collection Container Urimeter 02/07/24 0502   Securement Method secured to top of thigh w/ adhesive device 02/07/24 0502   Skin no redness;no breakdown 02/07/24 0502   Tolerance no signs/symptoms of discomfort 02/07/24 0502   Output (mL) 425 mL 02/07/24 0000          Physical Exam         Neurosurgery Physical Exam    General: well developed, well nourished, no distress.   Head: normocephalic, atraumatic  CV: RRR, pulses equal bilateral  Pulmonary: normal respirations, no signs of respiratory distress  Abdomen: soft, non-distended  Skin: Skin is warm, dry and intact.      Neuro:  E4V5M6  Awake, alert, Ox4  PERRL, EOMI  CNII-XII grossly intact  Motor: Follows commands full strength x4, R foot DF somehwat contracted   SILT  Negative bowser  No hyperreflexia                Significant Labs:  Recent Labs   Lab 02/06/24  0244 02/06/24  0410 02/07/24  0331   * 155*  155* 121*   * 131*  131* 133*   K 4.5 4.5  4.5 4.0    104  104 106   CO2 18* 20*  20* 17*   BUN 16 17  17 21   CREATININE 0.8 0.8  0.8 0.8   CALCIUM 9.0 9.3  9.3 9.1   MG 1.9 2.0 1.8     Recent Labs   Lab 02/06/24  0348 02/06/24  0410 02/07/24  0331   WBC 12.59 11.40 8.29   HGB 12.6* 12.9* 13.0*   HCT 35.6* 37.4* 37.3*   PLT 86* 91* 107*     Recent Labs   Lab 02/06/24  0410   INR 1.3*   APTT 30.5     Microbiology Results (last 7 days)       Procedure Component Value Units Date/Time    Urine culture [[id number]] Collected: 02/05/24 1932    Order Status: Completed Specimen: Urine Updated: 02/07/24 0412     Urine Culture, Routine No growth    Narrative:      Specimen Source->Urine          All pertinent labs from the last 24 hours have been reviewed.    Significant Diagnostics:  I have reviewed all pertinent imaging results/findings within the past 24 hours.

## 2024-02-07 NOTE — ASSESSMENT & PLAN NOTE
Patient's FSGs are controlled on current medication regimen.  Last A1c reviewed-  6  Lab Results   Component Value Date    HGBA1C 6.0 (H) 12/18/2023     Most recent fingerstick glucose reviewed-   Recent Labs   Lab 02/06/24  1749 02/07/24  0330 02/07/24  0757 02/07/24  1103   POCTGLUCOSE 156* 121* 144* 92       Current correctional scale  Medium  Maintain anti-hyperglycemic dose as follows-   Antihyperglycemics (From admission, onward)    Start     Stop Route Frequency Ordered    02/05/24 1937  insulin aspart U-100 pen 0-10 Units         -- SubQ Every 6 hours PRN 02/05/24 1842        Hold Oral hypoglycemics while patient is in the hospital.

## 2024-02-07 NOTE — SUBJECTIVE & OBJECTIVE
Interval History: No acute events. Stepped down to hospital medicine. Reported walking today with PT assistance. States feels like weakness is improving.     Review of Systems  Objective:     Vital Signs (Most Recent):  Temp: 97.9 °F (36.6 °C) (02/07/24 1100)  Pulse: 81 (02/07/24 1300)  Resp: 20 (02/07/24 1300)  BP: 119/71 (02/07/24 1300)  SpO2: (!) 94 % (02/07/24 1300) Vital Signs (24h Range):  Temp:  [97.7 °F (36.5 °C)-98.7 °F (37.1 °C)] 97.9 °F (36.6 °C)  Pulse:  [64-81] 81  Resp:  [12-28] 20  SpO2:  [92 %-100 %] 94 %  BP: (101-138)/(55-90) 119/71     Weight: 104.3 kg (230 lb)  Body mass index is 37.12 kg/m².    Intake/Output Summary (Last 24 hours) at 2/7/2024 1446  Last data filed at 2/7/2024 1300  Gross per 24 hour   Intake 700 ml   Output 2175 ml   Net -1475 ml         Physical Exam  Constitutional:       General: He is not in acute distress.     Appearance: Normal appearance. He is not toxic-appearing or diaphoretic.   Cardiovascular:      Rate and Rhythm: Normal rate and regular rhythm.      Heart sounds: No murmur heard.     No friction rub. No gallop.   Pulmonary:      Effort: Pulmonary effort is normal. No respiratory distress.      Breath sounds: No wheezing or rales.   Abdominal:      General: Abdomen is flat. There is no distension.      Palpations: Abdomen is soft.      Tenderness: There is no abdominal tenderness. There is no guarding or rebound.   Musculoskeletal:      Cervical back: Normal range of motion and neck supple.      Right lower leg: No edema.      Left lower leg: No edema.   Skin:     General: Skin is warm and dry.   Neurological:      Mental Status: He is alert.      Motor: Weakness present.             Significant Labs: All pertinent labs within the past 24 hours have been reviewed.  CBC:   Recent Labs   Lab 02/06/24  0348 02/06/24  0410 02/07/24  0331   WBC 12.59 11.40 8.29   HGB 12.6* 12.9* 13.0*   HCT 35.6* 37.4* 37.3*   PLT 86* 91* 107*     CMP:   Recent Labs   Lab 02/06/24  0244  02/06/24  0410 02/07/24  0331   * 131*  131* 133*   K 4.5 4.5  4.5 4.0    104  104 106   CO2 18* 20*  20* 17*   * 155*  155* 121*   BUN 16 17  17 21   CREATININE 0.8 0.8  0.8 0.8   CALCIUM 9.0 9.3  9.3 9.1   PROT 7.1 7.2  7.2 6.7   ALBUMIN 3.6 3.6  3.6 3.3*   BILITOT 1.1* 1.2*  1.2* 0.8   ALKPHOS 83 80  80 70   AST 23 24  24 37   ALT 23 24  24 43   ANIONGAP 10 7*  7* 10       Significant Imaging: I have reviewed all pertinent imaging results/findings within the past 24 hours.

## 2024-02-07 NOTE — ASSESSMENT & PLAN NOTE
74M with prior transverse myelitis admitted with increased BLE weakness and incontinence associated with acute febrile illness. MRI with multifocal cord compression. Neurosurgery following.     Due to spinal stenosis.     - s/p NCC, plan for step down to Hospital Medicine on 2/7  - Neurosurgery following  - Consider General Neurology consult on step down  - Q4h neurochecks while in ICU  - Q4h vitals while in ICU  - Continue home Baclofen, Gabapentin, Pramipexole  - CT MRI reviewed   - Daily CBC, transfuse PRN  - Lovenox  - PT/OT/SLP as appropriate  - CM/SW consult for dispo planning

## 2024-02-07 NOTE — ANESTHESIA PREPROCEDURE EVALUATION
"[facility]  Anesthesia Pre-Operative Evaluation         Patient Name: Jez Gardner  YOB: 1950  MRN: [id number]    SUBJECTIVE:     Pre-operative evaluation for Procedure(s) (LRB):  MRI (Magnetic Resonance Imagine) (N/A)     02/07/2024    Jez Gardner is a 74 y.o. male w/ a significant PMHx of transverse myelitis, DM, BPH, diabetes. Patient states that  when he attempted to get out of bed his leg " gave in".    MRI with cervical spondylosis, thoracolumbar spondylosis causing significant canal stenosis     Patient now presents for the above procedure(s).    Echo Summary  Results for orders placed during the hospital encounter of 02/05/24    Echo    Interpretation Summary    Left Ventricle: The left ventricle is normal in size. Normal wall thickness. There is concentric remodeling. Normal wall motion. There is normal systolic function. Ejection fraction by visual approximation is 55%. There is normal diastolic function.    Right Ventricle: Normal right ventricular cavity size. Wall thickness is normal. Right ventricle wall motion  is normal. Systolic function is normal.    Mitral Valve: There is moderate mitral annular calcification present. There is mild regurgitation.    Pulmonary Artery: Pulmonary artery pressure could not be estimated.    IVC/SVC: Normal venous pressure at 3 mmHg.       Prev airway: None documented.    LDA:        Peripheral IV - Single Lumen 02/05/24 1913 18 G Anterior;Distal;Left Upper Arm (Active)   Site Assessment Clean;Dry;Intact;No redness;No swelling 02/07/24 1300   Extremity Assessment Distal to IV No abnormal discoloration 02/07/24 1300   Line Status Saline locked 02/07/24 1300   Dressing Status Dry;Clean;Intact 02/07/24 1300   Dressing Intervention Integrity maintained 02/07/24 1300   Dressing Change Due 02/09/24 02/07/24 1300   Site Change Due 02/09/24 02/07/24 0700   Reason Not Rotated Not due 02/07/24 1300   Number of days: 1            Peripheral " IV - Single Lumen 02/05/24 1913 18 G Anterior;Left;Proximal Forearm (Active)   Site Assessment Clean;Dry;Intact;No redness;No swelling 02/07/24 1300   Extremity Assessment Distal to IV No abnormal discoloration 02/07/24 1300   Line Status Saline locked 02/07/24 1300   Dressing Status Clean;Dry;Intact 02/07/24 1300   Dressing Intervention Integrity maintained 02/07/24 1300   Dressing Change Due 02/09/24 02/07/24 1300   Site Change Due 02/09/24 02/07/24 0700   Reason Not Rotated Not due 02/07/24 1300   Number of days: 1       Male External Urinary Catheter 02/06/24 0126 Medium (Active)   Collection Container Urimeter 02/07/24 1300   Securement Method secured to top of thigh w/ adhesive device 02/07/24 1300   Skin no redness;no breakdown 02/07/24 1300   Tolerance no signs/symptoms of discomfort 02/07/24 1300   Output (mL) 425 mL 02/07/24 0000   Catheter Change Date 02/07/24 02/07/24 1300   Catheter Change Time 0500 02/07/24 1300   Number of days: 1       Drips: None documented.      Patient Active Problem List   Diagnosis    Transverse myelitis    Cord compression    Type 2 diabetes mellitus without complication, without long-term current use of insulin    BPH (benign prostatic hyperplasia)    Hypothyroid    Debility    Urinary incontinence    Hyponatremia       Review of patient's allergies indicates:   Allergen Reactions    Penicillins        Current Inpatient Medications:   atorvastatin  40 mg Oral Daily    baclofen  20 mg Oral TID    enoxparin  40 mg Subcutaneous Q24H (prophylaxis, 1700)    gabapentin  600 mg Oral TID    levothyroxine  25 mcg Oral Before breakfast    polyethylene glycol  17 g Oral Daily    pramipexole  0.75 mg Oral QHS    senna-docusate 8.6-50 mg  2 tablet Oral BID    tamsulosin  0.4 mg Oral Daily       No current facility-administered medications on file prior to encounter.     Current Outpatient Medications on File Prior to Encounter   Medication Sig Dispense Refill    levothyroxine (SYNTHROID)  25 MCG tablet Take 25 mcg by mouth before breakfast.      NOVOLOG FLEXPEN U-100 INSULIN 100 unit/mL (3 mL) InPn pen Inject 0-15 Units into the skin 3 (three) times daily with meals.      OZEMPIC 0.25 mg or 0.5 mg (2 mg/3 mL) pen injector Inject 0.5 mg into the skin every 7 days.      TRESIBA FLEXTOUCH U-100 100 unit/mL (3 mL) insulin pen Inject into the skin.      atorvastatin (LIPITOR) 40 MG tablet Take 40 mg by mouth once daily.      baclofen (LIORESAL) 20 MG tablet Take 20 mg by mouth 4 (four) times daily.      enalapril (VASOTEC) 10 MG tablet Take 10 mg by mouth once daily.      famotidine (PEPCID) 20 MG tablet Take 20 mg by mouth 2 (two) times daily.      gabapentin (NEURONTIN) 600 MG tablet Take 600 mg by mouth 3 (three) times daily.      HYDROcodone-acetaminophen (NORCO) 7.5-325 mg per tablet Take 1 tablet by mouth every 8 (eight) hours as needed for Pain.      meloxicam (MOBIC) 15 MG tablet Take 15 mg by mouth once daily.      metFORMIN (GLUCOPHAGE) 500 MG tablet Take 500 mg by mouth 3 (three) times daily.      pramipexole (MIRAPEX) 0.75 MG tablet Take 0.75 mg by mouth once daily.      tamsulosin (FLOMAX) 0.4 mg Cap Take 0.4 mg by mouth once daily.      triamcinolone acetonide 0.1% (KENALOG) 0.1 % cream Apply 1 g topically 3 (three) times daily as needed.         No past surgical history on file.    Social History:  Tobacco Use: Not on file      Alcohol Use: Not At Risk (2/6/2024)    AUDIT-C     Frequency of Alcohol Consumption: Never     Average Number of Drinks: Patient does not drink     Frequency of Binge Drinking: Never        OBJECTIVE:     Vital Signs Range (Last 24H):  Temp:  [36.5 °C (97.7 °F)-37.1 °C (98.7 °F)]   Pulse:  [64-81]   Resp:  [12-28]   BP: (101-138)/(55-90)   SpO2:  [92 %-100 %]       Significant Labs:  Lab Results   Component Value Date    WBC 8.29 02/07/2024    HGB 13.0 (L) 02/07/2024    HCT 37.3 (L) 02/07/2024     (L) 02/07/2024    CHOL 132 06/13/2023    TRIG 110 06/13/2023     HDL 40 06/13/2023    ALT 43 02/07/2024    AST 37 02/07/2024     (L) 02/07/2024    K 4.0 02/07/2024     02/07/2024    CREATININE 0.8 02/07/2024    BUN 21 02/07/2024    CO2 17 (L) 02/07/2024    TSH 2.61 06/13/2023    INR 1.3 (H) 02/06/2024    HGBA1C 6.0 (H) 12/18/2023    MICROALBUR <0.3 12/08/2023       Diagnostic Studies: No relevant studies.    EKG:   No results found for this or any previous visit.    2D ECHO:  TTE:  Results for orders placed or performed during the hospital encounter of 02/05/24   Echo   Result Value Ref Range    BSA 2.2 m2    LVOT stroke volume 99.69 cm3    LVIDd 4.6 3.5 - 6.0 cm    LV Systolic Volume 36.78 mL    LV Systolic Volume Index 17.3 mL/m2    LVIDs 3.06 2.1 - 4.0 cm    LV Diastolic Volume 95.14 mL    LV Diastolic Volume Index 44.88 mL/m2    IVS 0.8 0.6 - 1.1 cm    LVOT diameter 2.16 cm    LVOT area 3.7 cm2    FS 33 28 - 44 %    Left Ventricle Relative Wall Thickness 0.43 cm    Posterior Wall 1.0 0.6 - 1.1 cm    LV mass 137.72 g    LV Mass Index 65 g/m2    MV Peak E Guilherme 0.95 m/s    TDI LATERAL 0.11 m/s    TDI SEPTAL 0.08 m/s    E/E' ratio 10.00 m/s    MV Peak A Guilherme 0.90 m/s    E/A ratio 1.06     IVRT 94.20 msec    E wave deceleration time 155.24 msec    LV SEPTAL E/E' RATIO 11.88 m/s    LA Volume Index 31.8 mL/m2    LV LATERAL E/E' RATIO 8.64 m/s    LA volume 67.32 cm3    LVOT peak guilherme 1.00 m/s    RVDD 3.06 cm    TAPSE 2.22 cm    LA size 3.91 cm    Left Atrium Minor Axis 5.71 cm    Left Atrium Major Axis 5.97 cm    LA volume (mod) 50.00 cm3    LA WIDTH 3.47 cm    LA Volume Index (Mod) 23.6 mL/m2    RA Major Axis 4.05 cm    RA Width 3.03 cm    AV mean gradient 8 mmHg    AV peak gradient 12 mmHg    Ao peak guilherme 1.74 m/s    Ao VTI 33.67 cm    LVOT peak VTI 27.22 cm    AV valve area 2.96 cm²    AV Velocity Ratio 0.57     AV index (prosthetic) 0.81     JD by Velocity Ratio 2.10 cm²    MV stenosis pressure 1/2 time 45.02 ms    MV valve area p 1/2 method 4.89 cm2    Sinus 3.59 cm     STJ 2.89 cm    Ascending aorta 3.06 cm    Mean e' 0.10 m/s    ZLVIDS -2.29     ZLVIDD -3.76     EF 55 %    Est. RA pres 3 mmHg    Narrative      Left Ventricle: The left ventricle is normal in size. Normal wall   thickness. There is concentric remodeling. Normal wall motion. There is   normal systolic function. Ejection fraction by visual approximation is   55%. There is normal diastolic function.    Right Ventricle: Normal right ventricular cavity size. Wall thickness   is normal. Right ventricle wall motion  is normal. Systolic function is   normal.    Mitral Valve: There is moderate mitral annular calcification present.   There is mild regurgitation.    Pulmonary Artery: Pulmonary artery pressure could not be estimated.    IVC/SVC: Normal venous pressure at 3 mmHg.         NASRA:  No results found for this or any previous visit.    ASSESSMENT/PLAN:           Pre-op Assessment    I have reviewed the Patient Summary Reports.     I have reviewed the Nursing Notes. I have reviewed the NPO Status.   I have reviewed the Medications.     Review of Systems  Neurological:           cervical spondylosis, thoracolumbar spondylosis                            Endocrine:  Diabetes Hypothyroidism              Physical Exam  General: Well nourished, Cooperative, Alert and Oriented    Airway:  Mallampati: II   Mouth Opening: Normal  TM Distance: Normal  Tongue: Normal  Neck ROM: Normal ROM    Dental:  Intact        Anesthesia Plan  Type of Anesthesia, risks & benefits discussed:    Anesthesia Type: Gen Natural Airway  Intra-op Monitoring Plan: Standard ASA Monitors  Post Op Pain Control Plan: multimodal analgesia and IV/PO Opioids PRN  Induction:  IV  Airway Plan: Direct and Video  ASA Score: 3  Day of Surgery Review of History & Physical: H&P Update referred to the surgeon/provider.    Ready For Surgery From Anesthesia Perspective.     .

## 2024-02-07 NOTE — ASSESSMENT & PLAN NOTE
Patient has hyponatremia which is controlled,We will aim to correct the sodium by 4-6mEq in 24 hours. We will monitor sodium Daily. The hyponatremia is due to Dehydration/hypovolemia. We will obtain the following studies: see NCC lab. We will treat the hyponatremia with IVFs. Patient's sodium results have been reviewed and are listed below.  Recent Labs   Lab 02/07/24  0331   *

## 2024-02-07 NOTE — PLAN OF CARE
[facility] - Neuro Critical Care  Initial Discharge Assessment       Primary Care Provider: Bassam Graves MD      Admission Diagnosis: Cord compression [G95.20]    Admission Date: 2/5/2024  Expected Discharge Date: 2/9/2024    Transition of Care Barriers: None    Payor: HU[employer] MANAGED MEDICARE / Plan: HU[employer] MEDICARE PPO / Product Type: Medicare Advantage /     Extended Emergency Contact Information  Primary Emergency Contact: Autumn Gardner  Mobile Phone: [phone]  Relation: Spouse   needed? No    Discharge Plan A: Rehab  Discharge Plan B: Skilled Nursing Facility      [facility] - [place], [place] - [address]  [address]  [facility] [address]  Phone: [phone] Fax: [phone]    [facility] - [place], [place] - [address]  [address] [address]  Phone: [phone] Fax: [phone]        Transferred from:  [facility] [place]     Past Medical History:   Diagnosis Date    BPH (benign prostatic hyperplasia)     DM (diabetes mellitus)     Transverse myelitis          CM met with patient and Renetta Gardner (Hiwot) (wife) [phone]  in room for Discharge Planning Assessment.  Patient is able to answer questions.  Per patient, he lives with his wife in a single story house with 2 step(s) to enter.   Per patient, he was independent with ADLS and used a rollator for ambulation.  Patient will have assistance from his wife upon discharge.   Discharge Planning Booklet given to patient/family and discussed.  All questions addressed.  CM will follow for needs.      Discharge Plan A and Plan B have been determined by review of patient's clinical status, future medical and therapeutic needs, and coverage/benefits for post-acute care in coordination with multidisciplinary team members.        Initial Assessment (most recent)       Adult Discharge Assessment - 02/06/24 7458          Discharge Assessment    Assessment Type Discharge Planning Assessment      Confirmed/corrected address, phone number and insurance Yes     Confirmed Demographics Correct on Facesheet     Source of Information patient;family     Communicated DENIS with patient/caregiver Date not available/Unable to determine     Reason For Admission Transverse myelitis     People in Home spouse     Facility Arrived From: [facility]     Do you expect to return to your current living situation? Yes     Do you have help at home or someone to help you manage your care at home? Yes     Who are your caregiver(s) and their phone number(s)? Renetta Gardner (Hiwot) (wife) [phone]     Prior to hospitilization cognitive status: Alert/Oriented     Current cognitive status: Alert/Oriented     Walking or Climbing Stairs Difficulty yes     Walking or Climbing Stairs ambulation difficulty, requires equipment     Mobility Management rollator     Dressing/Bathing Difficulty no     Home Accessibility stairs to enter home     Number of Stairs, Main Entrance two     Home Layout Able to live on 1st floor     Equipment Currently Used at Home rollator     Readmission within 30 days? No     Patient currently being followed by outpatient case management? No     Do you currently have service(s) that help you manage your care at home? No     Do you take prescription medications? Yes     Do you have prescription coverage? Yes     Coverage [employer]     Do you have any problems affording any of your prescribed medications? No     Is the patient taking medications as prescribed? yes     Who is going to help you get home at discharge? Renetta Gardner (Hiwot) (wife) [phone]     How do you get to doctors appointments? family or friend will provide     Are you on dialysis? No     Do you take coumadin? No     Discharge Plan A Rehab     Discharge Plan B Skilled Nursing Facility     DME Needed Upon Discharge  other (see comments)   tbd    Discharge Plan discussed with: Patient;Spouse/sig other     Name(s) and Number(s) Renetta  Jj (Hiwot) (wife) [phone] (at bedside)     Transition of Care Barriers None        Physical Activity    On average, how many days per week do you engage in moderate to strenuous exercise (like a brisk walk)? 0 days     On average, how many minutes do you engage in exercise at this level? 0 min        Financial Resource Strain    How hard is it for you to pay for the very basics like food, housing, medical care, and heating? Not hard at all        Housing Stability    In the last 12 months, was there a time when you were not able to pay the mortgage or rent on time? No     In the last 12 months, how many places have you lived? 1     In the last 12 months, was there a time when you did not have a steady place to sleep or slept in a shelter (including now)? No        Transportation Needs    In the past 12 months, has lack of transportation kept you from medical appointments or from getting medications? No     In the past 12 months, has lack of transportation kept you from meetings, work, or from getting things needed for daily living? No        Food Insecurity    Within the past 12 months, you worried that your food would run out before you got the money to buy more. Never true     Within the past 12 months, the food you bought just didn't last and you didn't have money to get more. Never true        Stress    Do you feel stress - tense, restless, nervous, or anxious, or unable to sleep at night because your mind is troubled all the time - these days? Not at all        Social Connections    In a typical week, how many times do you talk on the phone with family, friends, or neighbors? More than three times a week     How often do you get together with friends or relatives? Once a week     How often do you attend Muslim or Orthodoxy services? More than 4 times per year     Do you belong to any clubs or organizations such as Muslim groups, unions, fraternal or athletic groups, or school groups? No     How often  do you attend meetings of the clubs or organizations you belong to? Never     Are you , , , , never , or living with a partner?         Alcohol Use    Q1: How often do you have a drink containing alcohol? Never     Q2: How many drinks containing alcohol do you have on a typical day when you are drinking? Patient does not drink     Q3: How often do you have six or more drinks on one occasion? Never                          Discharge Plan A and Plan B have been determined by review of patient's clinical status, future medical and therapeutic needs, and coverage/benefits for post-acute care in coordination with multidisciplinary team members.      Letty Gasca RN, CCRN-K, [facility]  Neuro-Critical Care   [phone]

## 2024-02-08 ENCOUNTER — ANESTHESIA (OUTPATIENT)
Dept: ENDOSCOPY | Facility: HOSPITAL | Age: 74
DRG: 552 | End: 2024-02-08
Payer: MEDICARE

## 2024-02-08 LAB
ALBUMIN SERPL BCP-MCNC: 3.4 G/DL (ref 3.5–5.2)
ALP SERPL-CCNC: 71 U/L (ref 55–135)
ALT SERPL W/O P-5'-P-CCNC: 43 U/L (ref 10–44)
ANION GAP SERPL CALC-SCNC: 10 MMOL/L (ref 8–16)
AST SERPL-CCNC: 30 U/L (ref 10–40)
BASOPHILS # BLD AUTO: 0.02 K/UL (ref 0–0.2)
BASOPHILS NFR BLD: 0.3 % (ref 0–1.9)
BILIRUB SERPL-MCNC: 0.8 MG/DL (ref 0.1–1)
BUN SERPL-MCNC: 17 MG/DL (ref 8–23)
CALCIUM SERPL-MCNC: 9 MG/DL (ref 8.7–10.5)
CHLORIDE SERPL-SCNC: 103 MMOL/L (ref 95–110)
CO2 SERPL-SCNC: 20 MMOL/L (ref 23–29)
CREAT SERPL-MCNC: 0.7 MG/DL (ref 0.5–1.4)
DIFFERENTIAL METHOD BLD: ABNORMAL
EOSINOPHIL # BLD AUTO: 0.1 K/UL (ref 0–0.5)
EOSINOPHIL NFR BLD: 1.5 % (ref 0–8)
ERYTHROCYTE [DISTWIDTH] IN BLOOD BY AUTOMATED COUNT: 13.9 % (ref 11.5–14.5)
EST. GFR  (NO RACE VARIABLE): >60 ML/MIN/1.73 M^2
GLUCOSE SERPL-MCNC: 109 MG/DL (ref 70–110)
HCT VFR BLD AUTO: 38.4 % (ref 40–54)
HGB BLD-MCNC: 13.3 G/DL (ref 14–18)
IMM GRANULOCYTES # BLD AUTO: 0.02 K/UL (ref 0–0.04)
IMM GRANULOCYTES NFR BLD AUTO: 0.3 % (ref 0–0.5)
LYMPHOCYTES # BLD AUTO: 1.8 K/UL (ref 1–4.8)
LYMPHOCYTES NFR BLD: 30.2 % (ref 18–48)
MAGNESIUM SERPL-MCNC: 1.7 MG/DL (ref 1.6–2.6)
MCH RBC QN AUTO: 30.5 PG (ref 27–31)
MCHC RBC AUTO-ENTMCNC: 34.6 G/DL (ref 32–36)
MCV RBC AUTO: 88 FL (ref 82–98)
MONOCYTES # BLD AUTO: 0.8 K/UL (ref 0.3–1)
MONOCYTES NFR BLD: 12.6 % (ref 4–15)
NEUTROPHILS # BLD AUTO: 3.3 K/UL (ref 1.8–7.7)
NEUTROPHILS NFR BLD: 55.1 % (ref 38–73)
NRBC BLD-RTO: 0 /100 WBC
PHOSPHATE SERPL-MCNC: 3.7 MG/DL (ref 2.7–4.5)
PLATELET # BLD AUTO: 115 K/UL (ref 150–450)
PMV BLD AUTO: 9.1 FL (ref 9.2–12.9)
POCT GLUCOSE: 100 MG/DL (ref 70–110)
POCT GLUCOSE: 109 MG/DL (ref 70–110)
POCT GLUCOSE: 115 MG/DL (ref 70–110)
POCT GLUCOSE: 118 MG/DL (ref 70–110)
POCT GLUCOSE: 121 MG/DL (ref 70–110)
POCT GLUCOSE: 193 MG/DL (ref 70–110)
POCT GLUCOSE: 98 MG/DL (ref 70–110)
POTASSIUM SERPL-SCNC: 4.1 MMOL/L (ref 3.5–5.1)
PROT SERPL-MCNC: 6.8 G/DL (ref 6–8.4)
RBC # BLD AUTO: 4.36 M/UL (ref 4.6–6.2)
SODIUM SERPL-SCNC: 133 MMOL/L (ref 136–145)
WBC # BLD AUTO: 5.93 K/UL (ref 3.9–12.7)

## 2024-02-08 PROCEDURE — 63600175 PHARM REV CODE 636 W HCPCS: Performed by: STUDENT IN AN ORGANIZED HEALTH CARE EDUCATION/TRAINING PROGRAM

## 2024-02-08 PROCEDURE — 85025 COMPLETE CBC W/AUTO DIFF WBC: CPT | Performed by: NURSE PRACTITIONER

## 2024-02-08 PROCEDURE — D9220A PRA ANESTHESIA: Mod: ANES,,, | Performed by: ANESTHESIOLOGY

## 2024-02-08 PROCEDURE — 80053 COMPREHEN METABOLIC PANEL: CPT | Performed by: NURSE PRACTITIONER

## 2024-02-08 PROCEDURE — 99232 SBSQ HOSP IP/OBS MODERATE 35: CPT | Mod: ,,, | Performed by: STUDENT IN AN ORGANIZED HEALTH CARE EDUCATION/TRAINING PROGRAM

## 2024-02-08 PROCEDURE — 11000001 HC ACUTE MED/SURG PRIVATE ROOM

## 2024-02-08 PROCEDURE — 84100 ASSAY OF PHOSPHORUS: CPT | Performed by: NURSE PRACTITIONER

## 2024-02-08 PROCEDURE — 25000003 PHARM REV CODE 250: Performed by: STUDENT IN AN ORGANIZED HEALTH CARE EDUCATION/TRAINING PROGRAM

## 2024-02-08 PROCEDURE — 97112 NEUROMUSCULAR REEDUCATION: CPT

## 2024-02-08 PROCEDURE — 97166 OT EVAL MOD COMPLEX 45 MIN: CPT

## 2024-02-08 PROCEDURE — 82962 GLUCOSE BLOOD TEST: CPT

## 2024-02-08 PROCEDURE — 37000008 HC ANESTHESIA 1ST 15 MINUTES

## 2024-02-08 PROCEDURE — 37000009 HC ANESTHESIA EA ADD 15 MINS

## 2024-02-08 PROCEDURE — 97162 PT EVAL MOD COMPLEX 30 MIN: CPT

## 2024-02-08 PROCEDURE — D9220A PRA ANESTHESIA: Mod: CRNA,,, | Performed by: STUDENT IN AN ORGANIZED HEALTH CARE EDUCATION/TRAINING PROGRAM

## 2024-02-08 PROCEDURE — 71000044 HC DOSC ROUTINE RECOVERY FIRST HOUR

## 2024-02-08 PROCEDURE — 25000003 PHARM REV CODE 250: Performed by: NURSE PRACTITIONER

## 2024-02-08 PROCEDURE — C9113 INJ PANTOPRAZOLE SODIUM, VIA: HCPCS | Performed by: STUDENT IN AN ORGANIZED HEALTH CARE EDUCATION/TRAINING PROGRAM

## 2024-02-08 PROCEDURE — 97530 THERAPEUTIC ACTIVITIES: CPT

## 2024-02-08 PROCEDURE — 83735 ASSAY OF MAGNESIUM: CPT | Performed by: NURSE PRACTITIONER

## 2024-02-08 PROCEDURE — 63600175 PHARM REV CODE 636 W HCPCS

## 2024-02-08 PROCEDURE — 63600175 PHARM REV CODE 636 W HCPCS: Performed by: NURSE PRACTITIONER

## 2024-02-08 RX ORDER — PROCHLORPERAZINE EDISYLATE 5 MG/ML
5 INJECTION INTRAMUSCULAR; INTRAVENOUS EVERY 30 MIN PRN
Status: DISCONTINUED | OUTPATIENT
Start: 2024-02-08 | End: 2024-02-08 | Stop reason: HOSPADM

## 2024-02-08 RX ORDER — HALOPERIDOL 5 MG/ML
0.5 INJECTION INTRAMUSCULAR EVERY 10 MIN PRN
Status: DISCONTINUED | OUTPATIENT
Start: 2024-02-08 | End: 2024-02-08 | Stop reason: HOSPADM

## 2024-02-08 RX ORDER — PANTOPRAZOLE SODIUM 40 MG/10ML
40 INJECTION, POWDER, LYOPHILIZED, FOR SOLUTION INTRAVENOUS ONCE
Status: COMPLETED | OUTPATIENT
Start: 2024-02-08 | End: 2024-02-08

## 2024-02-08 RX ORDER — PROPOFOL 10 MG/ML
VIAL (ML) INTRAVENOUS CONTINUOUS PRN
Status: DISCONTINUED | OUTPATIENT
Start: 2024-02-08 | End: 2024-02-08

## 2024-02-08 RX ORDER — MIDAZOLAM HYDROCHLORIDE 5 MG/ML
INJECTION INTRAMUSCULAR; INTRAVENOUS
Status: DISCONTINUED | OUTPATIENT
Start: 2024-02-08 | End: 2024-02-08

## 2024-02-08 RX ORDER — SODIUM CHLORIDE 0.9 % (FLUSH) 0.9 %
3 SYRINGE (ML) INJECTION
Status: DISCONTINUED | OUTPATIENT
Start: 2024-02-08 | End: 2024-02-08 | Stop reason: HOSPADM

## 2024-02-08 RX ORDER — ONDANSETRON HYDROCHLORIDE 2 MG/ML
4 INJECTION, SOLUTION INTRAVENOUS ONCE
Status: COMPLETED | OUTPATIENT
Start: 2024-02-08 | End: 2024-02-08

## 2024-02-08 RX ORDER — HYDROMORPHONE HYDROCHLORIDE 1 MG/ML
0.2 INJECTION, SOLUTION INTRAMUSCULAR; INTRAVENOUS; SUBCUTANEOUS EVERY 5 MIN PRN
Status: DISCONTINUED | OUTPATIENT
Start: 2024-02-08 | End: 2024-02-08 | Stop reason: HOSPADM

## 2024-02-08 RX ORDER — ONDANSETRON HYDROCHLORIDE 2 MG/ML
INJECTION, SOLUTION INTRAVENOUS
Status: COMPLETED
Start: 2024-02-08 | End: 2024-02-08

## 2024-02-08 RX ADMIN — SODIUM CHLORIDE: 9 INJECTION, SOLUTION INTRAVENOUS at 08:02

## 2024-02-08 RX ADMIN — BACLOFEN 20 MG: 10 TABLET ORAL at 08:02

## 2024-02-08 RX ADMIN — PROPOFOL 30 MCG/KG/MIN: 10 INJECTION, EMULSION INTRAVENOUS at 08:02

## 2024-02-08 RX ADMIN — ATORVASTATIN CALCIUM 40 MG: 40 TABLET, FILM COATED ORAL at 11:02

## 2024-02-08 RX ADMIN — ONDANSETRON 4 MG: 2 INJECTION INTRAMUSCULAR; INTRAVENOUS at 05:02

## 2024-02-08 RX ADMIN — BACLOFEN 20 MG: 10 TABLET ORAL at 11:02

## 2024-02-08 RX ADMIN — PRAMIPEXOLE DIHYDROCHLORIDE 0.75 MG: 0.12 TABLET ORAL at 08:02

## 2024-02-08 RX ADMIN — PROPOFOL 50 MG: 10 INJECTION, EMULSION INTRAVENOUS at 08:02

## 2024-02-08 RX ADMIN — OXYCODONE HYDROCHLORIDE 10 MG: 10 TABLET ORAL at 11:02

## 2024-02-08 RX ADMIN — GABAPENTIN 600 MG: 300 CAPSULE ORAL at 08:02

## 2024-02-08 RX ADMIN — ONDANSETRON HYDROCHLORIDE 4 MG: 2 INJECTION, SOLUTION INTRAVENOUS at 07:02

## 2024-02-08 RX ADMIN — PANTOPRAZOLE SODIUM 40 MG: 40 INJECTION, POWDER, FOR SOLUTION INTRAVENOUS at 06:02

## 2024-02-08 RX ADMIN — ENOXAPARIN SODIUM 40 MG: 40 INJECTION SUBCUTANEOUS at 05:02

## 2024-02-08 RX ADMIN — MIDAZOLAM 2 MG: 5 INJECTION INTRAMUSCULAR; INTRAVENOUS at 08:02

## 2024-02-08 RX ADMIN — ONDANSETRON 4 MG: 2 INJECTION INTRAMUSCULAR; INTRAVENOUS at 07:02

## 2024-02-08 RX ADMIN — GABAPENTIN 600 MG: 300 CAPSULE ORAL at 11:02

## 2024-02-08 RX ADMIN — OXYCODONE HYDROCHLORIDE 10 MG: 10 TABLET ORAL at 08:02

## 2024-02-08 RX ADMIN — BACLOFEN 20 MG: 10 TABLET ORAL at 02:02

## 2024-02-08 RX ADMIN — SENNOSIDES AND DOCUSATE SODIUM 2 TABLET: 8.6; 5 TABLET ORAL at 08:02

## 2024-02-08 RX ADMIN — TAMSULOSIN HYDROCHLORIDE 0.4 MG: 0.4 CAPSULE ORAL at 11:02

## 2024-02-08 RX ADMIN — INSULIN ASPART 2 UNITS: 100 INJECTION, SOLUTION INTRAVENOUS; SUBCUTANEOUS at 04:02

## 2024-02-08 NOTE — PROGRESS NOTES
[facility] - Surgery (1st Fl)  Neurosurgery  Progress Note    Subjective:     History of Present Illness: Pt is 74M hx of transverse myelitis with mild chronic Ble weakness, prior history of lumbar laminectomies last 2015 who preents with fever and new onset weakness BLE. Pt states a few days ago had fever of 103, and two days ago knees gave out and he fell to ground and has felt weak ever since. Pt states it has been dificult to ambulate since and tranferred to [facility] for further workup.    Post-Op Info:  Procedure(s) (LRB):  MRI (Magnetic Resonance Imagine) (N/A)   Day of Surgery   Interval History: 2/8: MRI C spine today with anesthesia. Exam stable.     Medications:  Continuous Infusions:  Scheduled Meds:   atorvastatin  40 mg Oral Daily    baclofen  20 mg Oral TID    electrolyte-S (pH 7.4)  250 mL Intravenous Once    enoxparin  40 mg Subcutaneous Q24H (prophylaxis, 1700)    gabapentin  600 mg Oral TID    levothyroxine  25 mcg Oral Before breakfast    polyethylene glycol  17 g Oral Daily    pramipexole  0.75 mg Oral QHS    senna-docusate 8.6-50 mg  2 tablet Oral BID    tamsulosin  0.4 mg Oral Daily     PRN Meds:bisacodyL, dextrose 10%, dextrose 10%, glucagon (human recombinant), haloperidol lactate, HYDROmorphone, insulin aspart U-100, magnesium oxide, magnesium oxide, ondansetron, oxyCODONE, potassium bicarbonate, potassium bicarbonate, potassium bicarbonate, potassium, sodium phosphates, potassium, sodium phosphates, potassium, sodium phosphates, prochlorperazine, sodium chloride 0.9%     Review of Systems  Objective:     Weight: 104.3 kg (230 lb)  Body mass index is 37.12 kg/m².  Vital Signs (Most Recent):  Temp: 98.4 °F (36.9 °C) (02/08/24 0923)  Pulse: 83 (02/08/24 0923)  Resp: 20 (02/08/24 0923)  BP: 112/67 (02/08/24 0923)  SpO2: 97 % (02/08/24 0923) Vital Signs (24h Range):  Temp:  [97.8 °F (36.6 °C)-99 °F (37.2 °C)] 98.4 °F (36.9 °C)  Pulse:  [74-91] 83  Resp:  [16-37] 20  SpO2:  [93 %-98 %] 97 %  BP:  "()/(5186) 112/67     Date 02/08/24 0700 - 02/09/24 0659   Shift 5424-4747 3443-0548 9127-7065 24 Hour Total   INTAKE   IV Piggyback 700   700   Shift Total(mL/kg) 700(6.7)   700(6.7)   OUTPUT   Shift Total(mL/kg)       Weight (kg) 104.3 104.3 104.3 104.3                       Male External Urinary Catheter 02/06/24 0126 Medium (Active)   Collection Container Urimeter 02/08/24 0302   Securement Method secured to top of thigh w/ adhesive device 02/08/24 0302   Skin no redness;no breakdown 02/08/24 0302   Tolerance no signs/symptoms of discomfort 02/08/24 0302   Output (mL) 200 mL 02/08/24 0202   Catheter Change Date 02/07/24 02/07/24 1700   Catheter Change Time 0500 02/07/24 1700          Physical Exam         Neurosurgery Physical Exam    General: well developed, well nourished, no distress.   Head: normocephalic, atraumatic  CV: RRR, pulses equal bilateral  Pulmonary: normal respirations, no signs of respiratory distress  Abdomen: soft, non-distended  Skin: Skin is warm, dry and intact.     Neuro:  E4V5M6  Awake, alert, Ox4  PERRL, EOMI  CNII-XII grossly intact  Motor: Follows commands full strength x4 other than R foot DF 4+ somewhat contracted   SILT  Negative bowser  No hyperreflexia       Significant Labs:  Recent Labs   Lab 02/07/24  0331 02/08/24  0300   * 109   * 133*   K 4.0 4.1    103   CO2 17* 20*   BUN 21 17   CREATININE 0.8 0.7   CALCIUM 9.1 9.0   MG 1.8 1.7     Recent Labs   Lab 02/07/24  0331 02/08/24  0300   WBC 8.29 5.93   HGB 13.0* 13.3*   HCT 37.3* 38.4*   * 115*     No results for input(s): "LABPT", "INR", "APTT" in the last 48 hours.  Microbiology Results (last 7 days)       Procedure Component Value Units Date/Time    Urine culture [[id number]] Collected: 02/05/24 1932    Order Status: Completed Specimen: Urine Updated: 02/07/24 0412     Urine Culture, Routine No growth    Narrative:      Specimen Source->Urine          All pertinent labs from the last 24 hours " have been reviewed.    Significant Diagnostics:  CT: No results found in the last 24 hours.  MRI: No results found in the last 24 hours.  Assessment/Plan:     Cord compression  Pt is 74M hx of transverse myelitis with mild chronic Ble weakness, prior history of lumbar laminectomies last 2015 who preents with fever and new onset weakness BLE. Pt states a few days ago had fever of 103, and two days ago knees gave out and he fell to ground and has felt weak ever since. Pt states it has been dificult to ambulate since and tranferred to [facility] for further workup.    Plan:  --Admitted to , floor status  --MRI CTL shows chronic multilevel cervical stenosis, prior L3-5 laminectomies with chronic degenrative changes, moderate stenosis but does not correlate with constellation of acute symptoms, as well as some subtle cauda equina nerve root enhancement.   -- MRI 2/8 reviewed with increased quality of images, stenosis at C3-6, discussed with patient we will arrange outpatient follow up with Dr. Edwards. No acute neurosurgical intervention, we will sign off.     Discussed with Dr. Grace Vidal MD  Neurosurgery  [facility] - Surgery ([facility] Fl)

## 2024-02-08 NOTE — PLAN OF CARE
Problem: Adult Inpatient Plan of Care  Goal: Optimal Comfort and Wellbeing  Outcome: Ongoing, Progressing     Problem: Pain (Stroke, Hemorrhagic)  Goal: Acceptable Pain Control  Outcome: Ongoing, Progressing     Problem: Urinary Elimination Impaired (Stroke, Hemorrhagic)  Goal: Effective Urinary Elimination  Outcome: Ongoing, Progressing     Problem: Diabetes Comorbidity  Goal: Blood Glucose Level Within Targeted Range  Outcome: Ongoing, Progressing    Patient resting in bed, given zofran for nausea x1 and protonix x1 for reflux. NPO since midnight 2/2 MRI with anesthesia. Patient in good spirits. CLWR, BR upx3, bed low, locked in position, bed alarm engaged. Safety maintained, will continue to monitor.

## 2024-02-08 NOTE — NURSING TRANSFER
Nursing Transfer Note      2/8/2024   3:42 AM    Nurse giving handoff: RUBÉN Rose  Nurse receiving handoff: RUBÉN Segal    Reason patient is being transferred: Step-down    Transfer To: 942 From: 9081    Transfer via bed    Transfer with cardiac monitoring    Transported by RN x1 and PCT x1    Transfer Vital Signs:  Blood Pressure:145/73  Heart Rate:82  O2:96  Temperature:98.2  Respirations:25    Telemetry: 0435  Order for Tele Monitor? Yes    4eyes on Skin: yes    Medicines sent: Insulin aspart pen    Any special needs or follow-up needed: Pt/Ot/Slp    Patient belongings transferred with patient: Yes    Chart send with patient: Yes    Notified: spouse    Patient reassessed at: 0340 2/8/2024  1  Upon arrival to floor: cardiac monitor applied, patient oriented to room, call bell in reach, and bed in lowest position

## 2024-02-08 NOTE — TRANSFER OF CARE
"Anesthesia Transfer of Care Note    Patient: Jez Gardner    Procedure(s) Performed: Procedure(s) (LRB):  MRI (Magnetic Resonance Imagine) (N/A)    Patient location: PACU    Anesthesia Type: general    Transport from OR: Transported from OR on 6-10 L/min O2 by face mask with adequate spontaneous ventilation    Post pain: adequate analgesia    Post assessment: no apparent anesthetic complications    Post vital signs: stable    Level of consciousness: responds to stimulation    Nausea/Vomiting: no nausea/vomiting    Complications: none    Transfer of care protocol was followed      Last vitals: Visit Vitals  /77 (BP Location: Right arm, Patient Position: Lying)   Pulse 78   Temp 37.2 °C (99 °F) (Temporal)   Resp 16   Ht 5' 6" (1.676 m)   Wt 104.3 kg (230 lb)   SpO2 95%   BMI 37.12 kg/m²     "

## 2024-02-08 NOTE — PLAN OF CARE
Report called to inpatient nurse RUBÉN Monsivais. Patient in Phase II recovery, VS noted to be stable. Remains NPO. No apparent s&s of distress noted at this time, no complaints voiced at this time. Patient from MRI with eyeglasses and is wearing them now.

## 2024-02-08 NOTE — PROGRESS NOTES
"[facility] - Neurosurgery ([facility])  [facility] Medicine  Progress Note    Patient Name: Jez Gardner  MRN: [id number]  Patient Class: IP- Inpatient   Admission Date: 2/5/2024  Length of Stay: 3 days  Attending Physician: Gumaro Vo*  Primary Care Provider: Bassam Graves MD        Subjective:     Principal Problem:Transverse myelitis        HPI:  74 y.o male PMH transverse myelitis, DM, BPH, presented to OSH with history of fever 103 and not able to ambulate and urinary incontinence.  Patient states that  when he attempted to get out of bed his leg "gave in". Patient admitted to [facility] for NSGY evaluation and neuro monitoring.      Hospital Course: 02/06/2024 No acute events.   MRI with cervical spondylosis, thoracolumbar spondylosis causing significant canal stenosis. Exam with improved BLE strength, antigravity in BLE. Neurosurgery following. VSS, infectious work up unrevealing. Stable for step down to  with Neurosurgery and General Neurology consults.   02/07/2024 No acute events. MR of Cspine pending. Boarding for Hosp Med. Na 133.  Wbc 13. May need surgery pending MRI findings.     Overview/Hospital Course:  No notes on file    Interval History: No acute events. Stepped down to hospital medicine. Reported walking today with PT assistance. States feels like weakness is improving.     Review of Systems  Objective:     Vital Signs (Most Recent):  Temp: 97.9 °F (36.6 °C) (02/07/24 1100)  Pulse: 81 (02/07/24 1300)  Resp: 20 (02/07/24 1300)  BP: 119/71 (02/07/24 1300)  SpO2: (!) 94 % (02/07/24 1300) Vital Signs (24h Range):  Temp:  [97.7 °F (36.5 °C)-98.7 °F (37.1 °C)] 97.9 °F (36.6 °C)  Pulse:  [64-81] 81  Resp:  [12-28] 20  SpO2:  [92 %-100 %] 94 %  BP: (101-138)/(55-90) 119/71     Weight: 104.3 kg (230 lb)  Body mass index is 37.12 kg/m².    Intake/Output Summary (Last 24 hours) at 2/7/2024 1446  Last data filed at 2/7/2024 1300  Gross per 24 hour   Intake 700 ml   Output 2175 ml   Net -1475 ml       " "  Physical Exam  Constitutional:       General: He is not in acute distress.     Appearance: Normal appearance. He is not toxic-appearing or diaphoretic.   Cardiovascular:      Rate and Rhythm: Normal rate and regular rhythm.      Heart sounds: No murmur heard.     No friction rub. No gallop.   Pulmonary:      Effort: Pulmonary effort is normal. No respiratory distress.      Breath sounds: No wheezing or rales.   Abdominal:      General: Abdomen is flat. There is no distension.      Palpations: Abdomen is soft.      Tenderness: There is no abdominal tenderness. There is no guarding or rebound.   Musculoskeletal:      Cervical back: Normal range of motion and neck supple.      Right lower leg: No edema.      Left lower leg: No edema.   Skin:     General: Skin is warm and dry.   Neurological:      Mental Status: He is alert.      Motor: Weakness present.             Significant Labs: All pertinent labs within the past 24 hours have been reviewed.  CBC:   Recent Labs   Lab 02/06/24  0348 02/06/24  0410 02/07/24  0331   WBC 12.59 11.40 8.29   HGB 12.6* 12.9* 13.0*   HCT 35.6* 37.4* 37.3*   PLT 86* 91* 107*     CMP:   Recent Labs   Lab 02/06/24  0244 02/06/24  0410 02/07/24  0331   * 131*  131* 133*   K 4.5 4.5  4.5 4.0    104  104 106   CO2 18* 20*  20* 17*   * 155*  155* 121*   BUN 16 17  17 21   CREATININE 0.8 0.8  0.8 0.8   CALCIUM 9.0 9.3  9.3 9.1   PROT 7.1 7.2  7.2 6.7   ALBUMIN 3.6 3.6  3.6 3.3*   BILITOT 1.1* 1.2*  1.2* 0.8   ALKPHOS 83 80  80 70   AST 23 24  24 37   ALT 23 24  24 43   ANIONGAP 10 7*  7* 10       Significant Imaging: I have reviewed all pertinent imaging results/findings within the past 24 hours.    Assessment/Plan:      * Transverse myelitis  See " Cord Compression"  S/p NCC   Weakness improving    Hyponatremia  Patient has hyponatremia which is controlled,We will aim to correct the sodium by 4-6mEq in 24 hours. We will monitor sodium Daily. The hyponatremia is " due to Dehydration/hypovolemia. We will obtain the following studies: see [facility] lab. We will treat the hyponatremia with IVFs. Patient's sodium results have been reviewed and are listed below.  Recent Labs   Lab 02/07/24  0331   *         Urinary incontinence  Due to spinal stenosis, BPH      Debility  Patient with Acute on chronic debility due to other reduced mobility. Latest AMPAC and GEMS scores have been reviewed. Evaluation for etiology is complete. Has spinal stenosis. High FALL risk. Plan includes PT/OT consulted.    A commode is needed because the patient is physically incapable of utilizing regular toilet facilities for a 30 to 90 day period as he is confined to one level of the home environment and there is no toilet on that level    Hypothyroid  Continue levothyroxine    BPH (benign prostatic hyperplasia)   Continue home flomax    Type 2 diabetes mellitus without complication, without long-term current use of insulin  Patient's FSGs are controlled on current medication regimen.  Last A1c reviewed-  6  Lab Results   Component Value Date    HGBA1C 6.0 (H) 12/18/2023     Most recent fingerstick glucose reviewed-   Recent Labs   Lab 02/06/24  1749 02/07/24  0330 02/07/24  0757 02/07/24  1103   POCTGLUCOSE 156* 121* 144* 92       Current correctional scale  Medium  Maintain anti-hyperglycemic dose as follows-   Antihyperglycemics (From admission, onward)      Start     Stop Route Frequency Ordered    02/05/24 1937  insulin aspart U-100 pen 0-10 Units         -- SubQ Every 6 hours PRN 02/05/24 1842          Hold Oral hypoglycemics while patient is in the hospital.      Cord compression   74M with prior transverse myelitis admitted with increased BLE weakness and incontinence associated with acute febrile illness. MRI with multifocal cord compression. Neurosurgery following.     Due to spinal stenosis.     - s/p NCC, plan for step down to Hospital Medicine on 2/7  - Neurosurgery following  - Consider  General Neurology consult on step down  - Q4h neurochecks while in ICU  - Q4h vitals while in ICU  - Continue home Baclofen, Gabapentin, Pramipexole  - CT MRI reviewed   - Daily CBC, transfuse PRN  - Lovenox  - PT/OT/SLP as appropriate  - CM/SW consult for dispo planning      VTE Risk Mitigation (From admission, onward)           Ordered     enoxaparin injection 40 mg  Every 24 hours         02/06/24 1017     IP VTE LOW RISK PATIENT  Once         02/05/24 1726     Place sequential compression device  Until discontinued         02/05/24 1726                    Discharge Planning   DENIS: 2/12/2024     Code Status: Full Code   Is the patient medically ready for discharge?: No    Reason for patient still in hospital (select all that apply): Patient trending condition and Treatment  Discharge Plan A: Rehab        Gumaro Vo MD  Department of Hospital Medicine   [facility] - Neurosurgery ([facility])

## 2024-02-08 NOTE — NURSING
Nurses Note -- 4 Eyes      2/8/2024   0400      Skin assessed during: Transfer      [x] No Altered Skin Integrity Present    [x]Prevention Measures Documented      [] Yes- Altered Skin Integrity Present or Discovered   [] LDA Added if Not in Epic (Describe Wound)   [] New Altered Skin Integrity was Present on Admit and Documented in LDA   [] Wound Image Taken    Wound Care Consulted? No    Attending Nurse:  Luzma Quintero RN/Staff Member:   Jose Daniel RN    Patient skin assessed thoroughly. Noted to be intact, no skin issues.

## 2024-02-08 NOTE — PT/OT/SLP EVAL
Physical Therapy Co-Evaluation and Co-Treatment    Patient Name:  Jez Gardner   MRN:  [id number]    Co-evaluation and co-treatment performed for this visit due to suspected patient need for two skilled therapists to ensure patient and staff safety and to accommodate for patient activity tolerance/pain management   Recommendations:     Discharge Recommendations: Moderate Intensity Therapy   Discharge Equipment Recommendations: bedside commode   Barriers to discharge: Increased level of assist, Inaccessible home, and Decreased caregiver support    Assessment:     Jez Gardner is a 74 y.o. male admitted with a medical diagnosis of Transverse myelitis. He presents with the following impairments/functional limitations: weakness, impaired endurance, impaired sensation, impaired self care skills, impaired functional mobility, gait instability, impaired balance, impaired coordination, decreased lower extremity function, decreased upper extremity function, decreased safety awareness, impaired fine motor. Pt with fair tolerance to therapy evaluation on this date. Pt presenting with impaired motor control, poor postural stability, gait deficits, decreased motor initiation, and decreased insight to current functional deficits. Pt requiring maximal assistance to complete brief ambulation trial with notable instability throughout. Pt unsafe to return home at current functional status and is considered at increased risk for falls. Recommend moderate intensity therapy following discharge once medically stable in order to reduce fall risk, reduce caregiver burden, improve quality of life, and maximize functional independence. Pt would continue to benefit from skilled acute PT in order to address current deficits and progress functional mobility.     Rehab Prognosis: Good; patient would benefit from acute skilled PT services 4 x/week to address these deficits and reach maximum level of function.  Recent Surgery: Procedure(s)  "(LRB):  MRI (Magnetic Resonance Imagine) (N/A) Day of Surgery    Plan:     During this hospitalization, patient to be seen 4 x/week to address the identified rehab impairments via gait training, therapeutic activities, therapeutic exercises, neuromuscular re-education and progress toward the following goals:    Plan of Care Expires:  03/08/24    Subjective     Chief Complaint: None verbalized  Patient/Family Comments/Goals: "I feel weak."  Pain/Comfort:  Pain Rating 1: 0/10    Patients cultural, spiritual, Jainism conflicts given the current situation: no    Living Environment:  Living Environment: Patient lives with their spouse in a single story house with number of outside stair(s): 2, ramped, and tub-shower combo.  Prior Level of Function: Prior to admission, patient  was modified independent using rollator. Since Saturday, pt has primarily been using motorized scooter for mobility. Pt stating he does not require assistance with ADLs .  Equipment Used at Home: rollator, shower chair, bath bench, walker, rolling (motorized scooter).  DME owned (not currently used): none  Assistance Upon Discharge: significant other    Objective:     Communicated with nursing prior to session. Patient found HOB elevated with Condom Catheter, telemetry upon PT entry to room.    General Precautions: Standard, fall  Orthopedic Precautions:N/A    Braces: N/A    Exams:  Cognitive Exam:  Patient is oriented to Person, Place, Time, Situation, follows commands 100% of the time  RLE ROM: WFL  RLE Strength: WFL  LLE ROM: WFL  LLE Strength: WFL  Sensation: -       Impaired  light/touch LLE    Functional Mobility:  Bed Mobility:  Verbal cues for sequencing and technique; increased time and cues required for completion  Supine to Sit: minimum assistance for trunk management; HOB elevated  Transfers:    Sit to Stand: x2 reps from EOB; moderate assistance with rolling walker with cues for hand placement and foot placement  Gait: Patient " ambulated 12' with rolling walker and maximal assistance.   Patient demonstrates unsteady gait, decreased step length, wide base of support, decreased weight shift, decreased foot clearance, ambulates outside HARISH of RW, flexed posture, decreased eduin, and inconsistent bilateral foot placement.   Patient required cues for upright posture, gluteal activation, sequencing, rolling walker management, obstacle navigation, safe rolling walker usage, to ambulate within HARISH of RW, increased step size, foot placement, and increased foot clearance.  All lines remained intact throughout ambulation trial, gait belt utilized, chair follow for patient safety.  Balance:   Static Sitting: Good, able to maintain for 6 minute(s) with stand by assistance  Dynamic Sitting: Good: Patient accepts moderate challenge, contact guard assistance  Static Standing: Poor, able to maintain for 3 minute(s) with moderate assistance  Dynamic Standing: Poor: Patient unable to accept challenge or move without loss of balance, maximal assistance    Therapeutic Activities and Exercises:  Patient educated on role of acute care PT and PT POC, safety while in hospital including calling nurse for mobility, and call light usage  Pt educated on the effects of bed rest and the importance of OOB activity. Pt encouraged to sit UIC majority of day as tolerated and continue daily transfers with nursing assist. Pt verbalized understanding.  Pt educated on importance of maximal participation in therapy session in order to reduce negative effects of prolonged sedentary positioning.   Answered all questions within PT scope of practice and addressed functional mobility concerns.    AM-PAC 6 CLICK MOBILITY  Total Score:13     Patient left up in chair with all lines intact, call button in reach, RN notified, and spouse present.    GOALS:   Multidisciplinary Problems       Physical Therapy Goals          Problem: Physical Therapy    Goal Priority Disciplines Outcome  Goal Variances Interventions   Physical Therapy Goal     PT, PT/OT Ongoing, Progressing     Description: Goals to be met by: 3/8/2024     Patient will increase functional independence with mobility by performin. Supine to sit with Stand-by Assistance  2. Sit to supine with Stand-by Assistance  3. Sit to stand transfer with Contact Guard Assistance  4. Bed to chair transfer with Contact Guard Assistance using LRAD  5. Gait  x 50 feet with Contact Guard Assistance using LRAD.   6. Lower extremity exercise program x15 reps per handout, with independence                         History:     Past Medical History:   Diagnosis Date    BPH (benign prostatic hyperplasia)     DM (diabetes mellitus)     Transverse myelitis        History reviewed. No pertinent surgical history.    Time Tracking:     PT Received On: 24  PT Start Time: 1243     PT Stop Time: 1305  PT Total Time (min): 22 min     Billable Minutes: Evaluation 10 Neuromuscular Re-education 12      2024

## 2024-02-08 NOTE — SUBJECTIVE & OBJECTIVE
Interval History: 2/8: MRI C spine today with anesthesia. Exam stable.     Medications:  Continuous Infusions:  Scheduled Meds:   atorvastatin  40 mg Oral Daily    baclofen  20 mg Oral TID    electrolyte-S (pH 7.4)  250 mL Intravenous Once    enoxparin  40 mg Subcutaneous Q24H (prophylaxis, 1700)    gabapentin  600 mg Oral TID    levothyroxine  25 mcg Oral Before breakfast    polyethylene glycol  17 g Oral Daily    pramipexole  0.75 mg Oral QHS    senna-docusate 8.6-50 mg  2 tablet Oral BID    tamsulosin  0.4 mg Oral Daily     PRN Meds:bisacodyL, dextrose 10%, dextrose 10%, glucagon (human recombinant), haloperidol lactate, HYDROmorphone, insulin aspart U-100, magnesium oxide, magnesium oxide, ondansetron, oxyCODONE, potassium bicarbonate, potassium bicarbonate, potassium bicarbonate, potassium, sodium phosphates, potassium, sodium phosphates, potassium, sodium phosphates, prochlorperazine, sodium chloride 0.9%     Review of Systems  Objective:     Weight: 104.3 kg (230 lb)  Body mass index is 37.12 kg/m².  Vital Signs (Most Recent):  Temp: 98.4 °F (36.9 °C) (02/08/24 0923)  Pulse: 83 (02/08/24 0923)  Resp: 20 (02/08/24 0923)  BP: 112/67 (02/08/24 0923)  SpO2: 97 % (02/08/24 0923) Vital Signs (24h Range):  Temp:  [97.8 °F (36.6 °C)-99 °F (37.2 °C)] 98.4 °F (36.9 °C)  Pulse:  [74-91] 83  Resp:  [16-37] 20  SpO2:  [93 %-98 %] 97 %  BP: ()/(51-86) 112/67     Date 02/08/24 0700 - 02/09/24 0659   Shift 4804-3811 1313-9290 9862-1216 24 Hour Total   INTAKE   IV Piggyback 700   700   Shift Total(mL/kg) 700(6.7)   700(6.7)   OUTPUT   Shift Total(mL/kg)       Weight (kg) 104.3 104.3 104.3 104.3                       Male External Urinary Catheter 02/06/24 0126 Medium (Active)   Collection Container Urimeter 02/08/24 0302   Securement Method secured to top of thigh w/ adhesive device 02/08/24 0302   Skin no redness;no breakdown 02/08/24 0302   Tolerance no signs/symptoms of discomfort 02/08/24 0302   Output (mL) 200 mL  "02/08/24 0202   Catheter Change Date 02/07/24 02/07/24 1700   Catheter Change Time 0500 02/07/24 1700          Physical Exam         Neurosurgery Physical Exam    General: well developed, well nourished, no distress.   Head: normocephalic, atraumatic  CV: RRR, pulses equal bilateral  Pulmonary: normal respirations, no signs of respiratory distress  Abdomen: soft, non-distended  Skin: Skin is warm, dry and intact.     Neuro:  E4V5M6  Awake, alert, Ox4  PERRL, EOMI  CNII-XII grossly intact  Motor: Follows commands full strength x4 other than R foot DF 4+ somewhat contracted   SILT  Negative bowser  No hyperreflexia       Significant Labs:  Recent Labs   Lab 02/07/24  0331 02/08/24  0300   * 109   * 133*   K 4.0 4.1    103   CO2 17* 20*   BUN 21 17   CREATININE 0.8 0.7   CALCIUM 9.1 9.0   MG 1.8 1.7     Recent Labs   Lab 02/07/24 0331 02/08/24  0300   WBC 8.29 5.93   HGB 13.0* 13.3*   HCT 37.3* 38.4*   * 115*     No results for input(s): "LABPT", "INR", "APTT" in the last 48 hours.  Microbiology Results (last 7 days)       Procedure Component Value Units Date/Time    Urine culture [[id number]] Collected: 02/05/24 1932    Order Status: Completed Specimen: Urine Updated: 02/07/24 0412     Urine Culture, Routine No growth    Narrative:      Specimen Source->Urine          All pertinent labs from the last 24 hours have been reviewed.    Significant Diagnostics:  CT: No results found in the last 24 hours.  MRI: No results found in the last 24 hours.  "

## 2024-02-08 NOTE — PLAN OF CARE
NPU Plan of Care Note  Dx Final diagnoses:  [G95.20] Cord compression     Shift Events MRI completed, diet resumed, glucose monitored    Neuro: aaox4    Vital Signs: vss    Respiratory: ra    Diet: regular    Urine Output/Bowel Movement: adequate, lbm 2/7           Problem: Adult Inpatient Plan of Care  Goal: Plan of Care Review  2/8/2024 1144 by Loi Montiel RN  Outcome: Ongoing, Progressing  2/8/2024 1144 by Loi Montiel RN  Outcome: Ongoing, Progressing  Goal: Patient-Specific Goal (Individualized)  Outcome: Ongoing, Progressing  Goal: Absence of Hospital-Acquired Illness or Injury  Outcome: Ongoing, Progressing  Goal: Optimal Comfort and Wellbeing  Outcome: Ongoing, Progressing  Goal: Readiness for Transition of Care  Outcome: Ongoing, Progressing     Problem: Adjustment to Illness (Stroke, Hemorrhagic)  Goal: Optimal Coping  Outcome: Ongoing, Progressing     Problem: Bowel Elimination Impaired (Stroke, Hemorrhagic)  Goal: Effective Bowel Elimination  Outcome: Ongoing, Progressing

## 2024-02-08 NOTE — PLAN OF CARE
OT eval completed, POC established  Problem: Occupational Therapy  Goal: Occupational Therapy Goal  Description: Goals to be met by: 3/8/24     Patient will increase functional independence with ADLs by performing:    UE Dressing with Supervision.  LE Dressing with Supervision.  Grooming while standing at sink with Minimal Assistance.  Supine to sit with Stand-by Assistance.  Step transfer with Stand-by Assistance  Toilet transfer to toilet with Stand-by Assistance.    Outcome: Ongoing, Progressing

## 2024-02-08 NOTE — PLAN OF CARE
PT evaluation complete and appropriate goals established.    Problem: Physical Therapy  Goal: Physical Therapy Goal  Description: Goals to be met by: 3/8/2024     Patient will increase functional independence with mobility by performin. Supine to sit with Stand-by Assistance  2. Sit to supine with Stand-by Assistance  3. Sit to stand transfer with Contact Guard Assistance  4. Bed to chair transfer with Contact Guard Assistance using LRAD  5. Gait  x 50 feet with Contact Guard Assistance using LRAD.   6. Lower extremity exercise program x15 reps per handout, with independence    Outcome: Ongoing, Progressing     2024

## 2024-02-08 NOTE — ASSESSMENT & PLAN NOTE
Pt is 74M hx of transverse myelitis with mild chronic Ble weakness, prior history of lumbar laminectomies last 2015 who preents with fever and new onset weakness BLE. Pt states a few days ago had fever of 103, and two days ago knees gave out and he fell to ground and has felt weak ever since. Pt states it has been dificult to ambulate since and tranferred to [facility] for further workup.    Plan:  --Admitted to [facility], No neurosurgical intervention at this time  --MRI CTL shows chronic multilevel cervical stenosis, prior L3-5 laminectomies with chronic degenrative changes, moderate stenosis but does not correlate with constellation of acute symptoms, as well as some subtle cauda equina nerve root enhancement.   -- f/u MRI c spine with anesthesia this AM  -- transverse myelitis workup given hx per [facility]/  -- PT/OT/OOB    Discussed with Dr. Edwards

## 2024-02-08 NOTE — PT/OT/SLP EVAL
Occupational Therapy   Co-Evaluation with PT  Co-evaluation/treatment performed due to patient's multiple deficits requiring two skilled therapists to appropriately and safely assess patient's strength, endurance, functional mobility, and ADL performance while facilitating functional tasks in addition to accommodating for patient's activity tolerance and medical acuity.    Name: Jez Gardner  MRN: [id number]  Admitting Diagnosis: Transverse myelitis  Recent Surgery: Procedure(s) (LRB):  MRI (Magnetic Resonance Imagine) (N/A) Day of Surgery    Recommendations:     Discharge Recommendations: Moderate Intensity Therapy  Discharge Equipment Recommendations:  bedside commode  Barriers to discharge:  Other (Comment) (increased skilled assist required)    Assessment:     Jez Gardner is a 74 y.o. male with a medical diagnosis of Transverse myelitis.  He presents with the following performance deficits affecting function: weakness, impaired endurance, impaired balance, decreased safety awareness, impaired self care skills, impaired functional mobility, gait instability, decreased lower extremity function, decreased upper extremity function, impaired coordination, impaired fine motor, decreased ROM, decreased coordination, impaired sensation.  Pt found with HOB elevated, agreeable to OT. Pt with impaired FMC and currently requiring mod-max A for OOB mobility. Pt is a high fall risk and is not safe to return home at this time. Patient currently demonstrates a need for moderate intensity therapy on a daily basis post acute secondary to a decline in functional status due to transverse myelitis.      Rehab Prognosis: Good; patient would benefit from acute skilled OT services to address these deficits and reach maximum level of function.       Plan:     Patient to be seen 4 x/week to address the above listed problems via self-care/home management, therapeutic activities, therapeutic exercises, neuromuscular re-education  Plan  "of Care Expires: 03/08/24  Plan of Care Reviewed with: patient, spouse    Subjective     Chief Complaint: impaired FMC, weakness  Patient/Family Comments/goals: "he's always hunched over from his back surgeries"    Occupational Profile:  Living Environment: Pt lives with his wife in a H with a ramp to enter. Bathroom setup consists of a tub/shower combo with a shower chair in it; pt also owns a TTB.  Previous level of function: independent with ADLs, typically mod I with use of a rollator for functional mobility, has been using a motorized scooter for mobility for ~1 week.  Roles and Routines: does not drive, enjoys watching videos on his ipad  Equipment Used at Home: walker, rolling, bath bench, shower chair, rollator (motorized scooter)  Assistance upon Discharge: wife    Pain/Comfort:  Pain Rating 1: 0/10  Pain Rating Post-Intervention 1: 0/10    Patients cultural, spiritual, Christian conflicts given the current situation: no    Objective:     Communicated with: RN prior to session.  Patient found HOB elevated with Condom Catheter, telemetry upon OT entry to room.    General Precautions: Standard, fall  Orthopedic Precautions: N/A  Braces: N/A  Respiratory Status: Room air    Occupational Performance:    Bed Mobility:    Patient completed Scooting/Bridging with minimum assistance  Patient completed Supine to Sit with minimum assistance, increased time, HOB elevated, and cues for hand placement    Functional Mobility/Transfers:  Patient completed Sit <> Stand Transfer with moderate assistance  with  RW, 2 trials   Functional Mobility: Pt completed functional mobility x12' with max A, RW and chair follow. Pt left up in chair at end of trial.    Activities of Daily Living:  Upper Body Dressing: minimum assistance to don gown as robe  Lower Body Dressing: stand by assistance and increased time to adjust socks at EOB  Toileting: dependence cath in place    Cognitive/Visual Perceptual:  Cognitive/Psychosocial " Skills:     -       Oriented to: Person, Place, Time, and Situation   -       Follows Commands/attention:Follows one-step commands  -       Communication: clear/fluent  -       Safety awareness/insight to disability: impaired   Visual/Perceptual:      -Intact      Physical Exam:  Balance:    -       SBA sitting balance, Mod-Max with RW standing balance  Sensation:    -       Impaired  parasthesias in B hands at baseline  Dominant hand:    -       Right  Upper Extremity Range of Motion:     -       Right Upper Extremity: WFL except shoulder flexion to 90*  -       Left Upper Extremity: WFL except shoulder flexion to 90*  Upper Extremity Strength:    -       Right Upper Extremity: WFL  -       Left Upper Extremity: WFL   Strength:    -       Right Upper Extremity: WFL  -       Left Upper Extremity: WFL  Fine Motor Coordination:    -       Impaired  4th and 5th digit opposition, bilaterally, 2/2 carpal tunnel    AMPAC 6 Click ADL:  AMPAC Total Score: 17    Treatment & Education:  Pt educated on the following:  - role of OT and OT POC  - use of call light to request for assistance with all functional mobility to ensure safety during hospital stay  - importance of continued mobilization  - Safe transfer techniques and proper body mechanics for fall prevention and improved independence with functional transfers   - Importance of OOB activities to increase endurance and tolerance for increased participation in daily ADLs.    - Various therex pt can perform outside of therapy session to increase functional endurance and strength for overall improved independence in occupations of choice.   - benefits of continued participation in therapy.   - Pt educated on importance of calling for staff assist for functional mobility/transfers.  - All pt questions within OT scope of practice addressed, pt verbalized understanding.      Patient left up in chair with all lines intact, call button in reach, RN notified, and wife  present    GOALS:   Multidisciplinary Problems       Occupational Therapy Goals          Problem: Occupational Therapy    Goal Priority Disciplines Outcome Interventions   Occupational Therapy Goal     OT, PT/OT Ongoing, Progressing    Description: Goals to be met by: 3/8/24     Patient will increase functional independence with ADLs by performing:    UE Dressing with Supervision.  LE Dressing with Supervision.  Grooming while standing at sink with Minimal Assistance.  Supine to sit with Stand-by Assistance.  Step transfer with Stand-by Assistance  Toilet transfer to toilet with Stand-by Assistance.                         History:     Past Medical History:   Diagnosis Date    BPH (benign prostatic hyperplasia)     DM (diabetes mellitus)     Transverse myelitis        History reviewed. No pertinent surgical history.    Time Tracking:     OT Date of Treatment: 02/08/24  OT Start Time: 1243  OT Stop Time: 1305  OT Total Time (min): 22 min    Billable Minutes:Evaluation 10  Therapeutic Activity 12    2/8/2024

## 2024-02-09 VITALS
BODY MASS INDEX: 36.96 KG/M2 | HEIGHT: 66 IN | SYSTOLIC BLOOD PRESSURE: 118 MMHG | WEIGHT: 230 LBS | DIASTOLIC BLOOD PRESSURE: 70 MMHG | HEART RATE: 83 BPM | TEMPERATURE: 97 F | OXYGEN SATURATION: 93 % | RESPIRATION RATE: 18 BRPM

## 2024-02-09 LAB
ALBUMIN SERPL BCP-MCNC: 3.4 G/DL (ref 3.5–5.2)
ALP SERPL-CCNC: 78 U/L (ref 55–135)
ALT SERPL W/O P-5'-P-CCNC: 36 U/L (ref 10–44)
ANION GAP SERPL CALC-SCNC: 8 MMOL/L (ref 8–16)
AST SERPL-CCNC: 22 U/L (ref 10–40)
BASOPHILS # BLD AUTO: 0.05 K/UL (ref 0–0.2)
BASOPHILS NFR BLD: 0.7 % (ref 0–1.9)
BILIRUB SERPL-MCNC: 0.8 MG/DL (ref 0.1–1)
BUN SERPL-MCNC: 15 MG/DL (ref 8–23)
CALCIUM SERPL-MCNC: 9 MG/DL (ref 8.7–10.5)
CHLORIDE SERPL-SCNC: 104 MMOL/L (ref 95–110)
CO2 SERPL-SCNC: 21 MMOL/L (ref 23–29)
CREAT SERPL-MCNC: 0.8 MG/DL (ref 0.5–1.4)
DIFFERENTIAL METHOD BLD: ABNORMAL
EOSINOPHIL # BLD AUTO: 0.2 K/UL (ref 0–0.5)
EOSINOPHIL NFR BLD: 2.3 % (ref 0–8)
ERYTHROCYTE [DISTWIDTH] IN BLOOD BY AUTOMATED COUNT: 13.9 % (ref 11.5–14.5)
EST. GFR  (NO RACE VARIABLE): >60 ML/MIN/1.73 M^2
GLUCOSE SERPL-MCNC: 116 MG/DL (ref 70–110)
HCT VFR BLD AUTO: 35.9 % (ref 40–54)
HGB BLD-MCNC: 12.5 G/DL (ref 14–18)
IMM GRANULOCYTES # BLD AUTO: 0.07 K/UL (ref 0–0.04)
IMM GRANULOCYTES NFR BLD AUTO: 0.9 % (ref 0–0.5)
LYMPHOCYTES # BLD AUTO: 1.7 K/UL (ref 1–4.8)
LYMPHOCYTES NFR BLD: 22.3 % (ref 18–48)
MAGNESIUM SERPL-MCNC: 1.8 MG/DL (ref 1.6–2.6)
MCH RBC QN AUTO: 30.4 PG (ref 27–31)
MCHC RBC AUTO-ENTMCNC: 34.8 G/DL (ref 32–36)
MCV RBC AUTO: 87 FL (ref 82–98)
MONOCYTES # BLD AUTO: 1.1 K/UL (ref 0.3–1)
MONOCYTES NFR BLD: 15 % (ref 4–15)
NEUTROPHILS # BLD AUTO: 4.4 K/UL (ref 1.8–7.7)
NEUTROPHILS NFR BLD: 58.8 % (ref 38–73)
NRBC BLD-RTO: 0 /100 WBC
PHOSPHATE SERPL-MCNC: 3.3 MG/DL (ref 2.7–4.5)
PLATELET # BLD AUTO: 109 K/UL (ref 150–450)
PMV BLD AUTO: 9.3 FL (ref 9.2–12.9)
POCT GLUCOSE: 167 MG/DL (ref 70–110)
POTASSIUM SERPL-SCNC: 4 MMOL/L (ref 3.5–5.1)
PROT SERPL-MCNC: 6.6 G/DL (ref 6–8.4)
RBC # BLD AUTO: 4.11 M/UL (ref 4.6–6.2)
SODIUM SERPL-SCNC: 133 MMOL/L (ref 136–145)
WBC # BLD AUTO: 7.48 K/UL (ref 3.9–12.7)

## 2024-02-09 PROCEDURE — 84100 ASSAY OF PHOSPHORUS: CPT | Performed by: NURSE PRACTITIONER

## 2024-02-09 PROCEDURE — 36415 COLL VENOUS BLD VENIPUNCTURE: CPT | Performed by: NURSE PRACTITIONER

## 2024-02-09 PROCEDURE — 25000003 PHARM REV CODE 250: Performed by: STUDENT IN AN ORGANIZED HEALTH CARE EDUCATION/TRAINING PROGRAM

## 2024-02-09 PROCEDURE — 83735 ASSAY OF MAGNESIUM: CPT | Performed by: NURSE PRACTITIONER

## 2024-02-09 PROCEDURE — 25000003 PHARM REV CODE 250: Performed by: NURSE PRACTITIONER

## 2024-02-09 PROCEDURE — 97110 THERAPEUTIC EXERCISES: CPT | Mod: CQ

## 2024-02-09 PROCEDURE — 85025 COMPLETE CBC W/AUTO DIFF WBC: CPT | Performed by: NURSE PRACTITIONER

## 2024-02-09 PROCEDURE — 97116 GAIT TRAINING THERAPY: CPT | Mod: CQ

## 2024-02-09 PROCEDURE — 80053 COMPREHEN METABOLIC PANEL: CPT | Performed by: NURSE PRACTITIONER

## 2024-02-09 RX ADMIN — Medication 800 MG: at 09:02

## 2024-02-09 RX ADMIN — ATORVASTATIN CALCIUM 40 MG: 40 TABLET, FILM COATED ORAL at 09:02

## 2024-02-09 RX ADMIN — LEVOTHYROXINE SODIUM 25 MCG: 25 TABLET ORAL at 07:02

## 2024-02-09 RX ADMIN — BACLOFEN 20 MG: 10 TABLET ORAL at 09:02

## 2024-02-09 RX ADMIN — TAMSULOSIN HYDROCHLORIDE 0.4 MG: 0.4 CAPSULE ORAL at 09:02

## 2024-02-09 RX ADMIN — INSULIN ASPART 2 UNITS: 100 INJECTION, SOLUTION INTRAVENOUS; SUBCUTANEOUS at 11:02

## 2024-02-09 RX ADMIN — GABAPENTIN 600 MG: 300 CAPSULE ORAL at 09:02

## 2024-02-09 NOTE — PT/OT/SLP PROGRESS
Physical Therapy Treatment    Patient Name:  Jez Gardner   MRN:  [id number]    Recommendations:     Discharge Recommendations: Moderate Intensity Therapy  Discharge Equipment Recommendations: bedside commode  Barriers to discharge: Inaccessible home and Decreased caregiver support    Assessment:     Jez Gardner is a 74 y.o. male admitted with a medical diagnosis of Transverse myelitis.  He presents with the following impairments/functional limitations: weakness, impaired endurance, gait instability, impaired functional mobility, decreased safety awareness, pain, decreased coordination.  Pt was agreeable and tolerated session well. Pt demonstrated good progress as pt required slightly less assistance with transfer and increase gait distance and required less assistance. Occasional cues for better form as pt reported decrease sensation in B feet. Pt is progressing well and continues to demonstrate the need for moderate intensity therapy on a daily basis post acute exhibited by decreased independence with functional mobility    Rehab Prognosis: Good; patient would benefit from acute skilled PT services to address these deficits and reach maximum level of function.    Recent Surgery: Procedure(s) (LRB):  MRI (Magnetic Resonance Imagine) (N/A) 1 Day Post-Op    Plan:     During this hospitalization, patient to be seen 4 x/week to address the identified rehab impairments via gait training, therapeutic activities, therapeutic exercises, neuromuscular re-education and progress toward the following goals:    Plan of Care Expires:  03/08/24    Subjective     Chief Complaint: none stated   Patient/Family Comments/goals: to get stronger  Pain/Comfort:  Pain Rating 1: 4/10  Location 1: back  Pain Addressed 1: Reposition  Pain Rating Post-Intervention 1: 4/10      Objective:     Communicated with RN prior to session.  Patient found HOB elevated with telemetry, bed alarm upon PT entry to room.     General Precautions:  Standard, fall  Orthopedic Precautions: N/A  Braces: N/A  Respiratory Status: Room air     Functional Mobility:  Additional staff present: Rehab Tech  Bed Mobility:   Scooting to EOB: minimum assistance  Supine to Sit: minimum assistance; HOB elevated  Assisted with trunk and LE management   Transfers:   Sit <> Stand Transfer: minimum assistance with rolling walker   Bed <> Chair Transfer: minimum assistance with rolling walker using Step Transfer technique   Gait:  Pt ambulated ~70+100 ft with minimum assistance and rolling walker. Rehab tech following with bedside chair   1 seated rest break and no overt LOB  Donned gown on backside   Gait Deviation(s): decrease eduin, decrease step length, exaggerated LLE placement d/t decrease sensation.  Verbal/tactile cues for gaze direction and RW management     AM-PAC 6 CLICK MOBILITY  Turning over in bed (including adjusting bedclothes, sheets and blankets)?: 3  Sitting down on and standing up from a chair with arms (e.g., wheelchair, bedside commode, etc.): 3  Moving from lying on back to sitting on the side of the bed?: 3  Moving to and from a bed to a chair (including a wheelchair)?: 3  Need to walk in hospital room?: 3  Climbing 3-5 steps with a railing?: 1  Basic Mobility Total Score: 16       Treatment & Education:  Seated BLE therex x20 reps: ankle pumps, long arc quads, and marches  Patient educated on role of therapy, goals of session, and benefits of out of bed mobility.   Instructed on use of call button and importance of calling nursing staff for assistance with mobility   Questions/concerns addressed within PTA scope of practice  Pt verbalized understanding.  Whiteboard Updated      Patient left up in chair with all lines intact, call button in reach, chair alarm on, and spouse present..    GOALS:   Multidisciplinary Problems       Physical Therapy Goals          Problem: Physical Therapy    Goal Priority Disciplines Outcome Goal Variances Interventions    Physical Therapy Goal     PT, PT/OT Ongoing, Progressing     Description: Goals to be met by: 3/8/2024     Patient will increase functional independence with mobility by performin. Supine to sit with Stand-by Assistance  2. Sit to supine with Stand-by Assistance  3. Sit to stand transfer with Contact Guard Assistance  4. Bed to chair transfer with Contact Guard Assistance using LRAD  5. Gait  x 50 feet with Contact Guard Assistance using LRAD.   6. Lower extremity exercise program x15 reps per handout, with independence                         Time Tracking:     PT Received On: 24  PT Start Time: 958     PT Stop Time: 1024  PT Total Time (min): 26 min     Billable Minutes: Gait Training 15 and Therapeutic Exercise 11    Treatment Type: Treatment  PT/PTA: PTA     Number of PTA visits since last PT visit: 2024

## 2024-02-11 NOTE — DISCHARGE SUMMARY
"[facility] - [facility]surgery ([facility])  [facility]  Discharge Summary      Patient Name: Jez Gardner  MRN: [id number]  AALIYAH: [id number]  Patient Class: IP- Inpatient  Admission Date: 2/5/2024  Hospital Length of Stay: 4 days  Discharge Date and Time: 2/9/24  Attending Physician: No att. providers found   Discharging Provider: Gumaro Vo MD  Primary Care Provider: Bassam Graves MD  [facility] Team: [facility]OS[facility] MED N Gumaro Vo MD  Primary Care Team: [facility] MED N    HPI:   74 y.o male PMH transverse myelitis, DM, BPH, presented to OSH with history of fever 103 and not able to ambulate and urinary incontinence.  Patient states that  when he attempted to get out of bed his leg "gave in". Patient admitted to [facility] for NSGY evaluation and neuro monitoring.      Hospital Course: 02/06/2024 No acute events.   MRI with cervical spondylosis, thoracolumbar spondylosis causing significant canal stenosis. Exam with improved BLE strength, antigravity in BLE. Neurosurgery following. VSS, infectious work up unrevealing. Stable for step down to  with Neurosurgery and General Neurology consults.   02/07/2024 No acute events. MR of Cspine pending. May need surgery pending MRI findings.   Patient strength improving with PT and ambulating. Tolerating PO. Per NSGY MRI 2/8 with stenosis at C3-6, No acute neurosurgical intervention, recommend follow up in clinic.     Procedure(s) (LRB):  MRI (Magnetic Resonance Imagine) (N/A)      Goals of Care Treatment Preferences:  Code Status: Full Code      Consults:   Consults (From admission, onward)          Status Ordering Provider     Inpatient consult to Neurosurgery  Once        Provider:  (Not yet assigned)    Completed JANET BEST     Inpatient consult to Physical Medicine Rehab  Once        Provider:  (Not yet assigned)    Completed JANET BEST L     Inpatient consult to Registered Dietitian/Nutritionist  Once        Provider:  (Not yet assigned)    " Completed JANET BEST          Physical Exam  Constitutional:       General: He is not in acute distress.     Appearance: Normal appearance. He is not toxic-appearing or diaphoretic.   Cardiovascular:      Rate and Rhythm: Normal rate and regular rhythm.      Heart sounds: No murmur heard.     No friction rub. No gallop.   Pulmonary:      Effort: Pulmonary effort is normal. No respiratory distress.      Breath sounds: No wheezing or rales.   Abdominal:      General: Abdomen is flat. There is no distension.      Palpations: Abdomen is soft.      Tenderness: There is no abdominal tenderness. There is no guarding or rebound.   Musculoskeletal:      Cervical back: Normal range of motion and neck supple.      Right lower leg: No edema.      Left lower leg: No edema.   Skin:     General: Skin is warm and dry.   Neurological:      Mental Status: He is alert.      Motor: Weakness present.     Final Active Diagnoses:    Diagnosis Date Noted POA    PRINCIPAL PROBLEM:  Transverse myelitis [G37.3] 02/05/2024 Yes    Urinary incontinence [R32] 02/06/2024 Yes    Hyponatremia [E87.1] 02/06/2024 No    Cord compression [G95.20] 02/05/2024 Yes    Type 2 diabetes mellitus without complication, without long-term current use of insulin [E11.9] 02/05/2024 Yes    BPH (benign prostatic hyperplasia) [N40.0] 02/05/2024 Yes    Hypothyroid [E03.9] 02/05/2024 Yes    Debility [R53.81] 02/05/2024 Yes      Problems Resolved During this Admission:       Discharged Condition: good    Disposition: Home or Self Care    Follow Up:   Follow-up [facility], [facility] Follow up.    Specialty: Home Health Services  Why: Agency will call you for an appointment  Contact information:  [address]  [id number]  [facility] [address]  [phone]               Bassam Graves MD .    Specialty: Family Medicine  Contact information:  [address] [address] [facility] [address]  [phone]                           Patient Instructions:      COMMODE  "FOR HOME USE     Order Specific Question Answer Comments   Type: Standard    Height: 5' 6" (1.676 m)    Weight: 104.3 kg (230 lb)    Does patient have medical equipment at home? walker, rolling motorized scooter / motorized scooter / motorized scooter / motorized scooter   Does patient have medical equipment at home? bath bench    Does patient have medical equipment at home? shower chair    Does patient have medical equipment at home? rollator    Length of need (1-99 months): 99      Ambulatory referral/consult to Neurosurgery   Standing Status: Future   Referral Priority: Routine Referral Type: Consultation   Referral Reason: Specialty Services Required   Requested Specialty: Neurosurgery   Number of Visits Requested: 1     Notify your health care provider if you experience any of the following:  temperature >100.4     Notify your health care provider if you experience any of the following:  persistent nausea and vomiting or diarrhea     Notify your health care provider if you experience any of the following:  severe uncontrolled pain     Notify your health care provider if you experience any of the following:  redness, tenderness, or signs of infection (pain, swelling, redness, odor or green/yellow discharge around incision site)     Notify your health care provider if you experience any of the following:  difficulty breathing or increased cough     Notify your health care provider if you experience any of the following:  severe persistent headache     Notify your health care provider if you experience any of the following:  worsening rash     Notify your health care provider if you experience any of the following:  persistent dizziness, light-headedness, or visual disturbances     Notify your health care provider if you experience any of the following:  increased confusion or weakness     Activity as tolerated       Significant Diagnostic Studies: N/A    Pending Diagnostic Studies:       Procedure Component Value " Units Date/Time    EKG, 12 - Lead [[id number]]     Order Status: Sent Lab Status: No result     Urinalysis, Reflex to Urine Culture Urine, Clean Catch [[id number]] Collected: 02/05/24 1745    Order Status: Sent Lab Status: In process Updated: 02/05/24 1750    Specimen: Urine            Medications:  Reconciled Home Medications:      Medication List        CONTINUE taking these medications      atorvastatin 40 MG tablet  Commonly known as: LIPITOR  Take 40 mg by mouth once daily.     baclofen 20 MG tablet  Commonly known as: LIORESAL  Take 20 mg by mouth 4 (four) times daily.     enalapril 10 MG tablet  Commonly known as: VASOTEC  Take 10 mg by mouth once daily.     famotidine 20 MG tablet  Commonly known as: PEPCID  Take 20 mg by mouth 2 (two) times daily.     gabapentin 600 MG tablet  Commonly known as: NEURONTIN  Take 600 mg by mouth 3 (three) times daily.     HYDROcodone-acetaminophen 7.5-325 mg per tablet  Commonly known as: NORCO  Take 1 tablet by mouth every 8 (eight) hours as needed for Pain.     levothyroxine 25 MCG tablet  Commonly known as: SYNTHROID  Take 25 mcg by mouth before breakfast.     meloxicam 15 MG tablet  Commonly known as: MOBIC  Take 15 mg by mouth once daily.     metFORMIN 500 MG tablet  Commonly known as: GLUCOPHAGE  Take 500 mg by mouth 3 (three) times daily.     NovoLOG Flexpen U-100 Insulin 100 unit/mL (3 mL) Inpn pen  Generic drug: insulin aspart U-100  Inject 0-15 Units into the skin 3 (three) times daily with meals.     OZEMPIC 0.25 mg or 0.5 mg (2 mg/3 mL) pen injector  Generic drug: semaglutide  Inject 0.5 mg into the skin every 7 days.     pramipexole 0.75 MG tablet  Commonly known as: MIRAPEX  Take 0.75 mg by mouth once daily.     tamsulosin 0.4 mg Cap  Commonly known as: FLOMAX  Take 0.4 mg by mouth once daily.     TRESIBA FLEXTOUCH U-100 100 unit/mL (3 mL) insulin pen  Generic drug: insulin degludec  Inject into the skin.     triamcinolone acetonide 0.1% 0.1 % cream  Commonly  known as: KENALOG  Apply 1 g topically 3 (three) times daily as needed.              Indwelling Lines/Drains at time of discharge:   Lines/Drains/Airways       None                   Time spent on the discharge of patient: 35 minutes         Gumaro Vo MD  Department of Hospital Medicine  [facility] - Neurosurgery ([facility])

## 2024-02-12 NOTE — ANESTHESIA POSTPROCEDURE EVALUATION
Anesthesia Post Evaluation    Patient: Jez Gardner    Procedure(s) Performed: Procedure(s) (LRB):  MRI (Magnetic Resonance Imagine) (N/A)    Final Anesthesia Type: general      Patient location during evaluation: PACU  Patient participation: Yes- Able to Participate  Level of consciousness: awake and alert and awake  Post-procedure vital signs: reviewed and stable  Pain management: adequate  Airway patency: patent    PONV status at discharge: No PONV  Anesthetic complications: no      Cardiovascular status: blood pressure returned to baseline  Respiratory status: unassisted and spontaneous ventilation  Hydration status: euvolemic  Follow-up not needed.              Vitals Value Taken Time   /77 02/08/24 1016   Temp 36.9 °C (98.4 °F) 02/08/24 0923   Pulse 76 02/08/24 1017   Resp 18 02/08/24 1017   SpO2 93 % 02/08/24 1017   Vitals shown include unvalidated device data.      No case tracking events are documented in the log.      Pain/Emma Score: No data recorded

## 2024-02-14 NOTE — PHYSICIAN QUERY
"PT Name: Jez Gardner  MR #: [id number]     DOCUMENTATION CLARIFICATION      CDS/:  Omega Vela, RN, CDS                   Contact Information:  [email or web address]  This form is a permanent document in the medical record.     Query Date: February 14, 2024    Dear Provider,  By submitting this query, we are merely seeking further clarification of documentation.  Please utilize your independent clinical judgment when addressing the question(s) below.     The Medical Record contains the following:      Supporting Clinical Findings     Location in Medical Record   74 y.o male PMH transverse myelitis, DM, BPH, presented to OSH with history of fever 103 and not able to ambulate and urinary incontinence.  Patient states that  when he attempted to get out of bed his leg "gave in".     MRI with cervical spondylosis, thoracolumbar spondylosis causing significant canal stenosis. Exam with improved BLE strength, antigravity in BLE. Neurosurgery following. VSS, infectious work up unrevealing    Principal Problem:Transverse myelitis    Transverse myelitis  See " Cord Compression"  S/p NCC   Weakness improving    Cord compression   74M with prior transverse myelitis admitted with increased BLE weakness and incontinence associated with acute febrile illness. MRI with multifocal cord compression. Neurosurgery following.      Due to spinal stenosis.   HM 2/8   Transverse myelitis      Hx of      See Cord compression    Will defer surgery for now, but will see patient in clinic in 4 weeks or so and discuss further. May eventually need ACDF.    NCC PN 2/7        NSGY PN Attestation 2/8   Impression:   1. Multilevel advanced degenerative disc disease and upper cervical   facet arthropathy resulting in moderate acquired spinal canal stenosis   at C4-5 and milder acquired spinal canal stenosis C3-4 C5-6 and C6-7   and multilevel severe foraminal stenosis.   2. Diminished image quality related to motion artifact with " no gross   cord signal abnormality apparent.     Impression:   1. Multilevel degenerative spinal canal foraminal narrowing in the cervical spine, as discussed.  2. As seen on the 02/05/2024 MRI, and there is persistent questionable mild/subtle T2 weighted intramedullary signal abnormality at the C4-5 level.  These findings are difficult to confirm on the sagittal T2 weighted sequence.  Given the degree of canal stenosis at this level, mild edema and/or early myelomalacia remain a consideration.   MRI C-Spine 2/5                MRI C-Spine 2/8     Please, due to conflicting documentation please clarify if the Transverse Myelitis diagnosis has been:    [  ] Ruled In     [  ] Ruled Out, Hx only     [  ] Other/Clarification of findings (please specify): _______________                 [ x ] Clinically undetermined     Please document in your progress notes daily for the duration of treatment, until resolved, and include in your discharge summary.    Form No. 19803

## 2024-02-23 ENCOUNTER — NURSE TRIAGE (OUTPATIENT)
Dept: ADMINISTRATIVE | Facility: CLINIC | Age: 74
End: 2024-02-23
Payer: MEDICARE

## 2024-02-23 NOTE — TELEPHONE ENCOUNTER
Pt's wife reports pt started having confusion around lunch time, wanting to take him to an ED but they live in [place] so not sure if they should be seen locally or drive to [place], because last time pt was flown out of their local hospital to [place] for treatment. Pt advised to be seen in the local ED to get immediate treatment and it can be determined by the staff there if pt would need to be sent elsewhere or if he can just stay locally for treatment. Wife declined to have pt triaged at this time and was encouraged to call back 24/7 with any worsening symptoms or questions. She verbalized understanding.    Reason for Disposition   General information question, no triage required and triager able to answer question    Protocols used: Information Only Call - No Triage-A-

## 2024-04-25 ENCOUNTER — OFFICE VISIT (OUTPATIENT)
Dept: NEUROSURGERY | Facility: CLINIC | Age: 74
End: 2024-04-25
Attending: STUDENT IN AN ORGANIZED HEALTH CARE EDUCATION/TRAINING PROGRAM
Payer: MEDICARE

## 2024-04-25 VITALS
WEIGHT: 219.38 LBS | DIASTOLIC BLOOD PRESSURE: 84 MMHG | SYSTOLIC BLOOD PRESSURE: 138 MMHG | HEART RATE: 75 BPM | HEIGHT: 66 IN | OXYGEN SATURATION: 96 % | BODY MASS INDEX: 35.26 KG/M2

## 2024-04-25 DIAGNOSIS — G37.3 TRANSVERSE MYELITIS: Primary | ICD-10-CM

## 2024-04-25 PROCEDURE — 1159F MED LIST DOCD IN RCRD: CPT | Mod: CPTII,S$GLB,, | Performed by: STUDENT IN AN ORGANIZED HEALTH CARE EDUCATION/TRAINING PROGRAM

## 2024-04-25 PROCEDURE — 3288F FALL RISK ASSESSMENT DOCD: CPT | Mod: CPTII,S$GLB,, | Performed by: STUDENT IN AN ORGANIZED HEALTH CARE EDUCATION/TRAINING PROGRAM

## 2024-04-25 PROCEDURE — 1101F PT FALLS ASSESS-DOCD LE1/YR: CPT | Mod: CPTII,S$GLB,, | Performed by: STUDENT IN AN ORGANIZED HEALTH CARE EDUCATION/TRAINING PROGRAM

## 2024-04-25 PROCEDURE — 4010F ACE/ARB THERAPY RXD/TAKEN: CPT | Mod: CPTII,S$GLB,, | Performed by: STUDENT IN AN ORGANIZED HEALTH CARE EDUCATION/TRAINING PROGRAM

## 2024-04-25 PROCEDURE — 1125F AMNT PAIN NOTED PAIN PRSNT: CPT | Mod: CPTII,S$GLB,, | Performed by: STUDENT IN AN ORGANIZED HEALTH CARE EDUCATION/TRAINING PROGRAM

## 2024-04-25 PROCEDURE — 99215 OFFICE O/P EST HI 40 MIN: CPT | Mod: S$GLB,,, | Performed by: STUDENT IN AN ORGANIZED HEALTH CARE EDUCATION/TRAINING PROGRAM

## 2024-04-25 PROCEDURE — 3079F DIAST BP 80-89 MM HG: CPT | Mod: CPTII,S$GLB,, | Performed by: STUDENT IN AN ORGANIZED HEALTH CARE EDUCATION/TRAINING PROGRAM

## 2024-04-25 PROCEDURE — 3008F BODY MASS INDEX DOCD: CPT | Mod: CPTII,S$GLB,, | Performed by: STUDENT IN AN ORGANIZED HEALTH CARE EDUCATION/TRAINING PROGRAM

## 2024-04-25 PROCEDURE — 3075F SYST BP GE 130 - 139MM HG: CPT | Mod: CPTII,S$GLB,, | Performed by: STUDENT IN AN ORGANIZED HEALTH CARE EDUCATION/TRAINING PROGRAM

## 2024-04-25 NOTE — PROGRESS NOTES
[facility]  Neurosurgery    SUBJECTIVE:     History of Present Illness:  Jez Gardner is a 74 y.o. male past medical history significant for myelitis, diabetes mellitus, who presents in hospital follow-up for evaluation of myelopathy.    Patient was previously seen in the hospital at the beginning of February.  He has a history of transverse myelitis with chronic lower extremity weakness.  At the time we saw him in the hospital in February he was describing weakness in his bilateral lower extremities and was unable to walk.  He has gotten a little bit better since that time and is ambulatory with the assistance today.  However, he describes a recent episode of what he describes as muscle spasms and he has lost some strength in his arms since that time.    Patient and I had discussed that he does have severe cervical stenosis at C3-4 and C4-5 which could be contributing to his symptoms of both his arms and his legs.  Both the patient as wife do not feel he is in good enough health to undergo any kind of surgery to fix this.  They feel that he is currently getting a little bit better.    Current Outpatient Medications   Medication Sig Dispense Refill    atorvastatin (LIPITOR) 40 MG tablet Take 40 mg by mouth once daily.      baclofen (LIORESAL) 20 MG tablet Take 20 mg by mouth 4 (four) times daily.      enalapril (VASOTEC) 10 MG tablet Take 10 mg by mouth once daily.      famotidine (PEPCID) 20 MG tablet Take 20 mg by mouth 2 (two) times daily.      gabapentin (NEURONTIN) 600 MG tablet Take 600 mg by mouth 3 (three) times daily.      HYDROcodone-acetaminophen (NORCO) 7.5-325 mg per tablet Take 1 tablet by mouth every 8 (eight) hours as needed for Pain.      levothyroxine (SYNTHROID) 25 MCG tablet Take 25 mcg by mouth before breakfast.      meloxicam (MOBIC) 15 MG tablet Take 15 mg by mouth once daily.      metFORMIN (GLUCOPHAGE) 500 MG tablet Take 500 mg by mouth 3 (three) times daily.      NOVOLOG  FLEXPEN U-100 INSULIN 100 unit/mL (3 mL) InPn pen Inject 0-15 Units into the skin 3 (three) times daily with meals.      OZEMPIC 0.25 mg or 0.5 mg (2 mg/3 mL) pen injector Inject 0.5 mg into the skin every 7 days.      pramipexole (MIRAPEX) 0.75 MG tablet Take 0.75 mg by mouth once daily.      tamsulosin (FLOMAX) 0.4 mg Cap Take 0.4 mg by mouth once daily.      triamcinolone acetonide 0.1% (KENALOG) 0.1 % cream Apply 1 g topically 3 (three) times daily as needed.      TRESIBA FLEXTOUCH U-100 100 unit/mL (3 mL) insulin pen Inject into the skin.       No current facility-administered medications for this visit.       Review of patient's allergies indicates:   Allergen Reactions    Penicillins        Past Medical History:   Diagnosis Date    BPH (benign prostatic hyperplasia)     DM (diabetes mellitus)     Transverse myelitis      Past Surgical History:   Procedure Laterality Date    MAGNETIC RESONANCE IMAGING N/A 2/8/2024    Procedure: MRI (Magnetic Resonance Imagine);  Surgeon: Kelly Butterfield;  Location: [facility];  Service: Anesthesiology;  Laterality: N/A;     No family history on file.  Social History     Tobacco Use    Smoking status: Former     Types: Cigarettes    Smokeless tobacco: Former   Substance Use Topics    Alcohol use: Not Currently    Drug use: Not Currently     Types: Marijuana      Review of Systems:  As noted in HPI    OBJECTIVE:     Vital Signs (Most Recent):  Pulse: 75 (04/25/24 1024)  BP: 138/84 (04/25/24 1024)  SpO2: 96 % (04/25/24 1024)    Physical Exam:  General: well developed, well nourished, no distress  Head: normocephalic, atraumatic  Neurologic: Alert and oriented. Thought content appropriate  Speech: Fluent  Cranial nerves: face symmetric   Eyes: pupils equal  Pulmonary: normal respirations  Sensory: intact to light touch throughout  Motor Strength: Moves all extremities spontaneously with good tone.     Delt Bi Tri Wrist ext.  IO  RUE:   5    5   5        5          5    5  LUE:       5    4-  5        5          5    5   HF KE EHL DF PF  RLE   5    5     5     5    5  LLE:  5    5     5     5    5    DTR's - 2 + and symmetric   Castro: absent  Clonus: absent    Gait: labored, needs assistanc    Diagnostic Results:  I have personally reviewed imaging. My impression is as follows.    MRI of the cervical spine which was performed back in February shows multilevel spondylosis.  He does have severe stenosis C3-4 and C4-5 due to a disc bulge at C3-4 as well as a combination of a disc bulge and a posterior ligamentous impingement at C4-5.  No obvious signal change but severe cord compression.      ASSESSMENT/PLAN:     Jez Gardner is a 74 y.o. male who presents with signs and symptoms of cervical myelopathy, confounded by pre-existing neuropathy, history of transverse myelitis.  -no acute intervention indicated  -I did offer the patient the consideration of surgery to decompress his spinal cord at C3-4 and C4-5.  He and his wife feel that he is currently getting a little bit better and do not feel he is in good enough health to undergo a surgery.  -I counseled the patient and his wife extensively on the signs and symptoms of cervical myelopathy and asked them to contact us if he begins to get worsening symptoms.  -we will plan to check in with the patient in about 3 months' time to see how he is doing  -we will place referral to Neurology in the [place] area, which is closer toward the patient lives in [place].  With patient's history of transverse myelitis and unexplained muscle spasms I think Neurology could provide some valuable input    Please feel free to call with any further questions.    Time spent on this encounter: 45 minutes. This includes face-to-face time and non-face to face time preparing to see the patient (eg, review of tests), obtaining and/or reviewing separately obtained history, documenting clinical information in the electronic or other health record, independently  interpreting results and communicating results to the patient/family/caregiver, or care coordinator.      David ORTIZ Mangham [facility]  Department of Neurosurgery  [phone]    Disclaimer: This note was dictated by speech recognition. Minor errors in transcription may be present.  Please call with any questions.

## 2025-01-17 ENCOUNTER — OFFICE VISIT (OUTPATIENT)
Dept: PODIATRY | Facility: CLINIC | Age: 75
End: 2025-01-17
Payer: MEDICARE

## 2025-01-17 VITALS — BODY MASS INDEX: 33.75 KG/M2 | WEIGHT: 210 LBS | HEIGHT: 66 IN

## 2025-01-17 DIAGNOSIS — E11.9 COMPREHENSIVE DIABETIC FOOT EXAMINATION, TYPE 2 DM, ENCOUNTER FOR: Primary | ICD-10-CM

## 2025-01-17 DIAGNOSIS — L60.9 DISEASE OF NAIL: ICD-10-CM

## 2025-01-17 DIAGNOSIS — E11.49 TYPE II DIABETES MELLITUS WITH NEUROLOGICAL MANIFESTATIONS: ICD-10-CM

## 2025-01-17 PROCEDURE — 1160F RVW MEDS BY RX/DR IN RCRD: CPT | Mod: CPTII,S$GLB,, | Performed by: PODIATRIST

## 2025-01-17 PROCEDURE — 99203 OFFICE O/P NEW LOW 30 MIN: CPT | Mod: 25,S$GLB,, | Performed by: PODIATRIST

## 2025-01-17 PROCEDURE — 11721 DEBRIDE NAIL 6 OR MORE: CPT | Mod: Q9,S$GLB,, | Performed by: PODIATRIST

## 2025-01-17 PROCEDURE — 4010F ACE/ARB THERAPY RXD/TAKEN: CPT | Mod: CPTII,S$GLB,, | Performed by: PODIATRIST

## 2025-01-17 PROCEDURE — 1126F AMNT PAIN NOTED NONE PRSNT: CPT | Mod: CPTII,S$GLB,, | Performed by: PODIATRIST

## 2025-01-17 PROCEDURE — 3008F BODY MASS INDEX DOCD: CPT | Mod: CPTII,S$GLB,, | Performed by: PODIATRIST

## 2025-01-17 PROCEDURE — 1159F MED LIST DOCD IN RCRD: CPT | Mod: CPTII,S$GLB,, | Performed by: PODIATRIST

## 2025-01-17 RX ORDER — CALCIUM CITRATE/VITAMIN D3 200MG-6.25
TABLET ORAL
COMMUNITY
Start: 2024-11-11

## 2025-01-17 RX ORDER — CIPROFLOXACIN 500 MG/1
500 TABLET ORAL 2 TIMES DAILY
COMMUNITY
Start: 2024-07-30

## 2025-01-17 RX ORDER — ESOMEPRAZOLE MAGNESIUM 40 MG/1
40 CAPSULE, DELAYED RELEASE ORAL 2 TIMES DAILY
COMMUNITY
Start: 2024-10-12

## 2025-02-03 NOTE — PROGRESS NOTES
Subjective:     Patient ID: Jez Gardner is a 75 y.o. male    Chief Complaint: Diabetic Foot Exam       Jez is a 75 y.o. male who presents to the clinic for evaluation and treatment of high risk feet. Jez has a past medical history of BPH (benign prostatic hyperplasia), DM (diabetes mellitus), and Transverse myelitis. The patient's chief complaint is long, thick toenails. This patient has documented high risk feet requiring routine maintenance secondary to diabetes mellitis and those secondary complications of diabetes, as mentioned..    PCP: Bassam Graves MD    Date Last Seen by PCP:  12/30/2024    Current shoe gear:  Affected Foot: Tennis shoes     Unaffected Foot: Tennis shoes    Hemoglobin A1C   Date Value Ref Range Status   12/18/2023 6.0 (H) <5.7 % Final     Comment:       Prediabetic: 5.7 - 6.4 %  Diabetic: > 6.4 %   06/13/2023 6.0 5.7 - 6.4 % Final   12/07/2022 5.9 5.7 - 6.4 % Final       Review of Systems   Constitutional: Negative.  Negative for chills and fever.   Respiratory:  Negative for cough and shortness of breath.    Cardiovascular:  Positive for leg swelling. Negative for chest pain.   Gastrointestinal:  Negative for diarrhea, nausea and vomiting.   Neurological:  Positive for tingling.        Objective:   Physical Exam  Vitals reviewed.   Constitutional:       General: He is not in acute distress.     Appearance: Normal appearance. He is not ill-appearing.   HENT:      Head: Normocephalic.      Nose: Nose normal.   Cardiovascular:      Pulses:           Dorsalis pedis pulses are 1+ on the right side and 1+ on the left side.        Posterior tibial pulses are 1+ on the right side and 1+ on the left side.   Pulmonary:      Effort: Pulmonary effort is normal. No respiratory distress.   Skin:     Capillary Refill: Capillary refill takes 2 to 3 seconds.   Neurological:      Mental Status: He is alert and oriented to person, place, and time.   Psychiatric:         Mood and Affect: Mood  normal.         Behavior: Behavior normal.         Thought Content: Thought content normal.        Foot Exam    General  General Appearance: appears stated age and healthy   Orientation: alert and oriented to person, place, and time   Affect: appropriate       Right Foot/Ankle     Inspection and Palpation  Swelling: dorsum   Skin Exam: skin changes and abnormal color;     Neurovascular  Dorsalis pedis: 1+  Posterior tibial: 1+    Muscle Strength  Ankle dorsiflexion: 5  Ankle plantar flexion: 5  Ankle inversion: 5  Ankle eversion: 5  Great toe extension: 5  Great toe flexion: 5      Left Foot/Ankle      Inspection and Palpation  Swelling: dorsum   Skin Exam: skin changes and abnormal color;     Neurovascular  Dorsalis pedis: 1+  Posterior tibial: 1+    Muscle Strength  Ankle dorsiflexion: 5  Ankle plantar flexion: 5  Ankle inversion: 5  Ankle eversion: 5  Great toe extension: 5  Great toe flexion: 5      Dermatologic: Nails 1 through 5 bilateral thick elongated discolored subungual debris  Vascular: +1 nonpitting edema noted bilateral ankle, pedal hair growth is absent bilateral lower extremity, atrophic soft tissue skin changes noted, skin temperature gradient warm  Neurologic:  Positive paresthesias reported, positive burning reported      Assessment:         1. Comprehensive diabetic foot examination, type 2 DM, encounter for    2. Type II diabetes mellitus with neurological manifestations    3. Disease of nail       Plan:     Jez Gardner was seen today for   Chief Complaint   Patient presents with    Diabetic Foot Exam       Assessment & Plan           Routine Foot Care    Date/Time: 1/17/2025 2:45 PM    Performed by: Alia Kwon DPM  Authorized by: Alia Kwon DPM    Consent Done?:  Yes (Verbal)    Nail Care Type:  Debride  Location(s): All  (Left 1st Toe, Left 3rd Toe, Left 2nd Toe, Left 4th Toe, Left 5th Toe, Right 1st Toe, Right 2nd Toe, Right 3rd Toe, Right 4th Toe and Right 5th  Toe)  Patient tolerance:  Patient tolerated the procedure well with no immediate complications       1. Patient was examined and evaluated.    2. Discussed with patient the etiology of nail fungus and as possible secondary issue to prior nail trauma.  Discussed with patient OTC topical conservative treatments such as Vicks vapor rub, tea tree oil as well as coconut oil.  Discussed with patient risks and benefits oral Lamisil therapy.   3. Patient was advised to continue with daily foot monitoring.  Patient will continue efforts proper glycemic control, lowering hemoglobin A1c, adherence to diabetic medication regimen  4. Discussed with patient etiology of peripheral vascular disease.  Patient was advised elevate lower extremity rest consider daily use of compression stockings.  Patient will monitor dietary salt intake and water consumption.    5. Patient will follow-up in 3-4 months or p.r.n. for complaints      Ghulam Kwon DPM  [id number] [place] [address]  [phone]      This note was created using [employer] direct voice recognition software. Note may have occasional typographical errors that may not have been identified and edited despite initial review prior to signing.

## 2025-04-16 ENCOUNTER — TELEPHONE (OUTPATIENT)
Dept: PODIATRY | Facility: CLINIC | Age: 75
End: 2025-04-16
Payer: MEDICARE